# Patient Record
Sex: FEMALE | Race: WHITE | NOT HISPANIC OR LATINO
[De-identification: names, ages, dates, MRNs, and addresses within clinical notes are randomized per-mention and may not be internally consistent; named-entity substitution may affect disease eponyms.]

---

## 2017-01-20 ENCOUNTER — APPOINTMENT (OUTPATIENT)
Dept: DERMATOLOGY | Facility: CLINIC | Age: 67
End: 2017-01-20

## 2017-06-28 ENCOUNTER — OTHER (OUTPATIENT)
Age: 67
End: 2017-06-28

## 2018-01-24 ENCOUNTER — APPOINTMENT (OUTPATIENT)
Dept: DERMATOLOGY | Facility: CLINIC | Age: 68
End: 2018-01-24
Payer: MEDICARE

## 2018-01-24 PROCEDURE — 99214 OFFICE O/P EST MOD 30 MIN: CPT

## 2018-07-20 ENCOUNTER — APPOINTMENT (OUTPATIENT)
Dept: INFECTIOUS DISEASE | Facility: HOSPITAL | Age: 68
End: 2018-07-20
Payer: SELF-PAY

## 2018-07-20 DIAGNOSIS — Z71.89 OTHER SPECIFIED COUNSELING: ICD-10-CM

## 2018-07-20 PROCEDURE — 90471 IMMUNIZATION ADMIN: CPT | Mod: NC

## 2018-07-20 PROCEDURE — 90717 YELLOW FEVER VACCINE SUBQ: CPT

## 2018-07-20 PROCEDURE — 99401 PREV MED CNSL INDIV APPRX 15: CPT | Mod: 25

## 2018-07-20 RX ORDER — ATOVAQUONE AND PROGUANIL HYDROCHLORIDE 250; 100 MG/1; MG/1
250-100 TABLET, FILM COATED ORAL DAILY
Qty: 23 | Refills: 0 | Status: ACTIVE | COMMUNITY
Start: 2018-07-20 | End: 1900-01-01

## 2018-07-20 RX ORDER — AZITHROMYCIN 250 MG/1
250 TABLET, FILM COATED ORAL
Qty: 4 | Refills: 0 | Status: ACTIVE | COMMUNITY
Start: 2018-07-20 | End: 1900-01-01

## 2018-08-16 ENCOUNTER — APPOINTMENT (OUTPATIENT)
Dept: INTERNAL MEDICINE | Facility: CLINIC | Age: 68
End: 2018-08-16

## 2019-02-06 ENCOUNTER — APPOINTMENT (OUTPATIENT)
Dept: DERMATOLOGY | Facility: CLINIC | Age: 69
End: 2019-02-06
Payer: MEDICARE

## 2019-02-06 PROCEDURE — 99214 OFFICE O/P EST MOD 30 MIN: CPT

## 2019-03-19 ENCOUNTER — APPOINTMENT (OUTPATIENT)
Dept: PLASTIC SURGERY | Facility: CLINIC | Age: 69
End: 2019-03-19

## 2019-03-26 ENCOUNTER — APPOINTMENT (OUTPATIENT)
Dept: PLASTIC SURGERY | Facility: CLINIC | Age: 69
End: 2019-03-26
Payer: MEDICARE

## 2019-03-26 VITALS
OXYGEN SATURATION: 99 % | HEART RATE: 61 BPM | BODY MASS INDEX: 24.16 KG/M2 | HEIGHT: 65 IN | WEIGHT: 145 LBS | DIASTOLIC BLOOD PRESSURE: 76 MMHG | TEMPERATURE: 98.1 F | SYSTOLIC BLOOD PRESSURE: 111 MMHG

## 2019-03-26 DIAGNOSIS — S61.219A LACERATION W/OUT FOREIGN BODY OF UNSPECIFIED FINGER W/OUT DAMAGE TO NAIL, INITIAL ENCOUNTER: ICD-10-CM

## 2019-03-26 PROCEDURE — 99203 OFFICE O/P NEW LOW 30 MIN: CPT

## 2019-03-30 NOTE — ADDENDUM
[FreeTextEntry1] : I, Terrance Lucas, acted solely as a scribe for Dr. Jimmy Burns on this date 3/26/19.

## 2020-02-21 ENCOUNTER — APPOINTMENT (OUTPATIENT)
Dept: DERMATOLOGY | Facility: CLINIC | Age: 70
End: 2020-02-21
Payer: MEDICARE

## 2020-02-21 PROCEDURE — 99214 OFFICE O/P EST MOD 30 MIN: CPT

## 2020-06-19 ENCOUNTER — TRANSCRIPTION ENCOUNTER (OUTPATIENT)
Age: 70
End: 2020-06-19

## 2021-05-07 ENCOUNTER — APPOINTMENT (OUTPATIENT)
Dept: DERMATOLOGY | Facility: CLINIC | Age: 71
End: 2021-05-07
Payer: MEDICARE

## 2021-05-07 PROCEDURE — 99214 OFFICE O/P EST MOD 30 MIN: CPT

## 2021-05-25 ENCOUNTER — APPOINTMENT (OUTPATIENT)
Dept: DERMATOLOGY | Facility: CLINIC | Age: 71
End: 2021-05-25
Payer: MEDICARE

## 2021-05-25 PROCEDURE — D0098: CPT

## 2021-07-16 ENCOUNTER — APPOINTMENT (OUTPATIENT)
Dept: DERMATOLOGY | Facility: CLINIC | Age: 71
End: 2021-07-16
Payer: MEDICARE

## 2021-07-16 PROCEDURE — ZZZZZ: CPT

## 2021-07-26 ENCOUNTER — EMERGENCY (EMERGENCY)
Facility: HOSPITAL | Age: 71
LOS: 1 days | Discharge: LEFT BEFORE DISCHARGE | End: 2021-07-26
Attending: STUDENT IN AN ORGANIZED HEALTH CARE EDUCATION/TRAINING PROGRAM
Payer: MEDICARE

## 2021-07-26 VITALS
RESPIRATION RATE: 18 BRPM | DIASTOLIC BLOOD PRESSURE: 90 MMHG | SYSTOLIC BLOOD PRESSURE: 186 MMHG | OXYGEN SATURATION: 97 % | TEMPERATURE: 98 F | WEIGHT: 130.51 LBS | HEART RATE: 69 BPM | HEIGHT: 65 IN

## 2021-07-26 PROCEDURE — 99284 EMERGENCY DEPT VISIT MOD MDM: CPT | Mod: GC

## 2021-07-26 PROCEDURE — 99283 EMERGENCY DEPT VISIT LOW MDM: CPT

## 2021-07-26 PROCEDURE — 93010 ELECTROCARDIOGRAM REPORT: CPT

## 2021-07-26 PROCEDURE — 93005 ELECTROCARDIOGRAM TRACING: CPT

## 2021-07-26 NOTE — ED PROVIDER NOTE - OBJECTIVE STATEMENT
70F HLD, HTN, presents with left shoulder/neck pain waking her up 2 hours ago.  Patient also notes feeling "strange" so she took an aspirin concerned for a heart attack.   Patient states she was playing a lot of pickleball 2 days ago and was winded.  Otherwise denies pain, bleeding, SOB, chest pain, abdominal pain or fevers.  Denies other pertinent medical problems.  Denies any  substance use.

## 2021-07-26 NOTE — ED PROVIDER NOTE - CLINICAL SUMMARY MEDICAL DECISION MAKING FREE TEXT BOX
70F HLD, HTN, presents with left shoulder/neck pain waking her up 2 hours ago.  Exam/vitals normal other than some shoulder pain that is very musculoskeletal in nature.  EKG Normal

## 2021-07-26 NOTE — ED PROVIDER NOTE - ATTENDING CONTRIBUTION TO CARE
71 yo female presents for evaluation of left shoulder pain that woke her from sleep. Patient grossly well appearing. I discussed the examination with the resident, and completed the key components of the history and physical exam. I then discussed the management plan with the resident. Patient however, walked out of the department before obtaining xray, medication, and my physical evaluation.

## 2021-07-26 NOTE — ED ADULT NURSE NOTE - CHIEF COMPLAINT QUOTE
Patient states that she doesn't feel right, complaining of burning pain in back shoulders and neck radiating to arm, feeling weak in upper extremities, has HX of HTN on medication

## 2021-07-26 NOTE — ED PROVIDER NOTE - PHYSICAL EXAMINATION
General: NAD, well appearing  HEENT: Normocephalic, EOM intact  Neck: No apparent stiffness or JVD or tenderness.  Pulm: Chest wall symmetric and nontender, lungs clear to ascultation   Cardiac: Regular rate and regular rhythm  Abdomen: Nontender and nondistended  Skin: Skin is warm, dry and intact without rashes or lesions.  Neuro: No apparent motor or sensory deficits, NIH 0, full strength and sensation.  Psych: Alert, oriented, and cooperative   MSK: Patient reports mild pain in shoulder against resistance. Otherwise normal shoulder and arm, no tenderness.

## 2021-07-26 NOTE — ED PROVIDER NOTE - NS ED ROS FT
General: No fever, no massive bleeding  Head: No HA, no ongoing scalp bleed  ENT: + neck pain, no sore throat  Resp: No SOB, no hemoptysis  Cardiovascular: No CP, No LOC  GI: No abdominal pain, No blood in stool  : No dysuria, no hematuria   MSK: No back pain, + limb pain  Skin: No painful or bleeding lesions  Neuro: No paresthesias, No focal deficits

## 2021-07-26 NOTE — ED ADULT TRIAGE NOTE - CHIEF COMPLAINT QUOTE
Patient states that she doesn't feel right, complaining of burning pain in back and shoulders and neck radiating to arm, feeling weak, has HX of HTN Patient states that she doesn't feel right, complaining of burning pain in back shoulders and neck radiating to arm, feeling weak in upper extremities, has HX of HTN on medication

## 2021-12-13 ENCOUNTER — APPOINTMENT (OUTPATIENT)
Dept: ORTHOPEDIC SURGERY | Facility: CLINIC | Age: 71
End: 2021-12-13
Payer: MEDICARE

## 2021-12-13 VITALS
BODY MASS INDEX: 21.99 KG/M2 | HEIGHT: 65 IN | DIASTOLIC BLOOD PRESSURE: 90 MMHG | HEART RATE: 69 BPM | SYSTOLIC BLOOD PRESSURE: 148 MMHG | WEIGHT: 132 LBS

## 2021-12-13 DIAGNOSIS — M25.569 PAIN IN UNSPECIFIED KNEE: ICD-10-CM

## 2021-12-13 PROCEDURE — 73564 X-RAY EXAM KNEE 4 OR MORE: CPT | Mod: RT

## 2021-12-13 PROCEDURE — 73502 X-RAY EXAM HIP UNI 2-3 VIEWS: CPT | Mod: RT

## 2021-12-13 PROCEDURE — 99204 OFFICE O/P NEW MOD 45 MIN: CPT

## 2021-12-13 RX ORDER — MELOXICAM 15 MG/1
15 TABLET ORAL
Qty: 30 | Refills: 0 | Status: ACTIVE | COMMUNITY
Start: 2021-12-13 | End: 1900-01-01

## 2021-12-13 NOTE — PHYSICAL EXAM
[de-identified] : GENERAL APPEARANCE: Well nourished and hydrated, pleasant, alert, and oriented x 3. Appears their stated age. \par HEENT: Normocephalic, extraocular eye motion intact. Nasal septum midline. Oral cavity clear. External auditory canal clear. \par RESPIRATORY: Breath sounds clear and audible in all lobes. No wheezing, No accessory muscle use.\par CARDIOVASCULAR: No apparent abnormalities. No lower leg edema. No varicosities. Pedal pulses are palpable.\par NEUROLOGIC: Sensation is normal, no muscle weakness in the upper or lower extremities.\par DERMATOLOGIC: No apparent skin lesions, moist, warm, no rash.\par SPINE: Cervical spine appears normal and moves freely; thoracic spine appears normal and moves freely; lumbosacral spine appears normal and moves freely, normal, nontender.\par MUSCULOSKELETAL: Hands, wrists, and elbows are normal and move freely, shoulders are normal and move freely. \par Psychiatric: Oriented to person, place, and time, insight and judgement were intact and the affect was normal. \par Musculoskeletal: ambulates normally. Right hip exam showed positive groin pain with SLR, ROM is limited to internal/external rotation with mechanical block and pain in extremes, JAYESH negative, FADIR negative.  No significant leg length inequality noted\par 5/5 motor strength in bilateral lower extremities. Sensory: Intact in bilateral lower extremities. DTRs: Biceps, brachioradialis, triceps, patellar, ankle and plantar 2+ and symmetric bilaterally. Pulses: dorsalis pedis, posterior tibial, femoral, popliteal, and radial 2+ and symmetric bilaterally. \par Musculoskeletal:. Right knee exam shows no effusion, ROM is 0-1 35 degrees, no instability, no pain with Tayler, no joint line tenderness. \par 5/5 motor strength in bilateral lower extremities. Sensory: Intact in bilateral lower extremities. DTRs: Biceps, brachioradialis, triceps, patellar, ankle and plantar 2+ and symmetric bilaterally. Pulses: dorsalis pedis, posterior tibial, femoral, popliteal, and radial 2+ and symmetric bilaterally. \par Constitutional: Alert and in no acute distress, but well-appearing. \par  [de-identified] : AP pelvis and 2 views of the right hip obtained in the office today show no acute fracture or dislocation.  There is moderate to severe right hip osteoarthritis with joint space narrowing osteophyte formation as well as subchondral sclerosis.\par \par 4 views of the right knee obtained in the office today show no acute fracture dislocation.  There is mild medial joint space narrowing consistent with Kellgren-Amanuel grade 1 2 changes.

## 2021-12-13 NOTE — DISCUSSION/SUMMARY
[Medication Risks Reviewed] : Medication risks reviewed [Surgical risks reviewed] : Surgical risks reviewed [de-identified] : Patient is a 71-year-old female with moderate to severe right hip osteoarthritis as well as mild right knee osteoarthritis presenting today for initial evaluation.  I do think that most of the pain she is experiencing in her groin as well as her leg are coming from her hip and she is having referred pain in her knee.  She has no concerning signs on exam today of any knee pathology.  She is also not having any effusions in her knee.  I therefore recommended conservative treatment at this time.  I gave her a prescription for meloxicam 15 mg daily as needed for pain.  Have also recommended physical therapy.  I recommend an intra-articular cortisone injection to her right hip.  I will see her back after the injection for repeat examination.  If she still having pain and symptoms in her knee will consider cortisone injection at that time.  She was advised that should be a candidate for total hip in the future and we did discuss the possibility of a total hip as well as the procedure.  She would like to defer this at this time I think that is reasonable.  She will follow up in 2 to 3 months for repeat examination.

## 2021-12-28 ENCOUNTER — TRANSCRIPTION ENCOUNTER (OUTPATIENT)
Age: 71
End: 2021-12-28

## 2022-01-15 NOTE — HISTORY OF PRESENT ILLNESS
[de-identified] : 71-year-old female here today for evaluation of right hip and knee pain has been going on for many years.  Patient states over the last few months she is been having increasing pain in her groin as well as her right knee especially with activities such as navigating stairs or deep flexion.  Patient states that she is unable to rotate her knee normal in the right side like she does in the left side.  Has not tried anything for the pain.  States she is still able to play pickle ball as long as she wears a brace on her knee.  Complains of some pain behind her right kneecap.  Denies any neurovascular compromise in lower extremity.  Denies any history of trauma. Pt NPO at present

## 2022-04-27 ENCOUNTER — APPOINTMENT (OUTPATIENT)
Dept: ORTHOPEDIC SURGERY | Facility: CLINIC | Age: 72
End: 2022-04-27

## 2022-06-21 ENCOUNTER — APPOINTMENT (OUTPATIENT)
Dept: ORTHOPEDIC SURGERY | Facility: CLINIC | Age: 72
End: 2022-06-21
Payer: MEDICARE

## 2022-06-21 VITALS
DIASTOLIC BLOOD PRESSURE: 92 MMHG | SYSTOLIC BLOOD PRESSURE: 146 MMHG | BODY MASS INDEX: 21.99 KG/M2 | HEART RATE: 68 BPM | HEIGHT: 65 IN | WEIGHT: 132 LBS

## 2022-06-21 DIAGNOSIS — M25.561 PAIN IN RIGHT KNEE: ICD-10-CM

## 2022-06-21 PROCEDURE — 99213 OFFICE O/P EST LOW 20 MIN: CPT

## 2022-06-21 PROCEDURE — 73564 X-RAY EXAM KNEE 4 OR MORE: CPT | Mod: RT

## 2022-06-21 NOTE — HISTORY OF PRESENT ILLNESS
[de-identified] : Patient is a 71 year old female who presents c/o right pain.  patient states was seeing Dr. Patel in December for Right hip and knee pain stated was sent for hip injection however did not have, however did have PT with relief.  patient states 3 weeks ago was playing volleyball and twisted knee while coming down from jump.  patient states pain was diffuse with swelling.  patient was using advil and rest with ice with relief.  patient states pain currently only with twisting of knee currently.  Patient wears knee brace for relief. Patient states in past had hx of partially torn ACL and meniscal tear

## 2022-06-21 NOTE — DISCUSSION/SUMMARY
[de-identified] : ANDRIY JULIO is a 71 year old female who presents with right knee pain generalized weakness of the right leg. I believe this is most likely the result of her right hip arthritis as her right knee exam is benign and imaging shows well preserved joint spaces. A prescription for physical therapy was provided for both her hip and her knee. The patient will follow up as needed.

## 2022-06-21 NOTE — CONSULT LETTER
[Dear  ___] : Dear  [unfilled], [Consult Letter:] : I had the pleasure of evaluating your patient, [unfilled]. [Please see my note below.] : Please see my note below. [Consult Closing:] : Thank you very much for allowing me to participate in the care of this patient.  If you have any questions, please do not hesitate to contact me. [Sincerely,] : Sincerely, [FreeTextEntry2] : SARAH MATTHEWS MD\par  [FreeTextEntry3] : Octaviano Doyle MD\par Chief of Joint Replacement\par Primary [facility] Hip [facility] Knee Replacement \par [facility] [place]\par \par

## 2022-06-21 NOTE — PHYSICAL EXAM
[de-identified] : The patient appears well nourished  and in no apparent distress.  The patient is alert and oriented to person, place, and time.   Affect and mood appear normal. The head is normocephalic and atraumatic.  The eyes reveal normal sclera and extra ocular muscles are intact. The tongue is midline with no apparent lesions.  Skin shows normal turgor with no evidence of eczema or psoriasis.  No respiratory distress noted.  Sensation grossly intact.		 [de-identified] : Exam of the right hip shows hip flexion of 80 degrees, hip external rotation of 40 degrees, hip internal rotation of 0 degrees. 	 \par Exam of the right knee shows there is minimal effusion. There is no joint line tenderness.   Negative anterior drawer.  No pain with ROM.\par 5/5 motor strength bilaterally distally. Sensation intact distally.		  [de-identified] : X-ray: 4 views of the right knee demonstrate well preserved joint spaces.

## 2022-06-21 NOTE — ADDENDUM
[FreeTextEntry1] : This note was authored by Yakov Garcia working as a medical scribe for Dr. Octaviano Doyle. The note was reviewed, edited, and revised by Dr. Octaviano Doyle whom is in agreement with the exam findings, imaging findings, and treatment plan. 06/21/2022

## 2022-06-22 ENCOUNTER — TRANSCRIPTION ENCOUNTER (OUTPATIENT)
Age: 72
End: 2022-06-22

## 2022-06-22 ENCOUNTER — APPOINTMENT (OUTPATIENT)
Dept: DERMATOLOGY | Facility: CLINIC | Age: 72
End: 2022-06-22
Payer: MEDICARE

## 2022-06-22 PROCEDURE — 99214 OFFICE O/P EST MOD 30 MIN: CPT

## 2022-10-21 ENCOUNTER — APPOINTMENT (OUTPATIENT)
Dept: ORTHOPEDIC SURGERY | Facility: CLINIC | Age: 72
End: 2022-10-21

## 2022-10-21 VITALS
BODY MASS INDEX: 21.99 KG/M2 | HEART RATE: 75 BPM | DIASTOLIC BLOOD PRESSURE: 82 MMHG | WEIGHT: 132 LBS | SYSTOLIC BLOOD PRESSURE: 115 MMHG | HEIGHT: 65 IN

## 2022-10-21 PROCEDURE — 99213 OFFICE O/P EST LOW 20 MIN: CPT

## 2022-10-21 PROCEDURE — 73030 X-RAY EXAM OF SHOULDER: CPT | Mod: LT

## 2022-10-21 RX ORDER — METHYLPREDNISOLONE 4 MG/1
4 TABLET ORAL
Qty: 1 | Refills: 0 | Status: ACTIVE | COMMUNITY
Start: 2022-10-21 | End: 1900-01-01

## 2022-10-21 NOTE — HISTORY OF PRESENT ILLNESS
[de-identified] : 10/21/2022: Eva is a pleasant 71-year-old left-hand-dominant female presents to the office today complaining of left shoulder pain.  The patient is very active and plays volleyball, pickleball, and weight lifts in the gym.  Over the past 3 weeks she has noted worsening pain on the top of her left shoulder and the base of her neck and trapezial muscles.  She did have the COVID-vaccine on October 1 but she is unsure if this is related.  Her pain is primarily activity related and alleviated by rest.  It hurts to do any overhead serving in volleyball.  She has never had issues like this before.  She has had no formal treatment.  The patient denies any fevers, chills, sweats, recent illnesses, numbness, tingling, or pain elsewhere at this time.

## 2022-10-21 NOTE — CONSULT LETTER
[Dear  ___] : Dear  [unfilled], [Consult Letter:] : I had the pleasure of evaluating your patient, [unfilled]. [( Thank you for referring [unfilled] for consultation for _____ )] : Thank you for referring [unfilled] for consultation for [unfilled] [Please see my note below.] : Please see my note below. [Consult Closing:] : Thank you very much for allowing me to participate in the care of this patient.  If you have any questions, please do not hesitate to contact me. [Sincerely,] : Sincerely, [FreeTextEntry2] : SARAH MATTHEWS\par  [FreeTextEntry3] : Rayshawn Benavides DO.\par Sports Medicine \par Orthopaedic Surgery\par \par [facility] [place]\par [facility] \par [address] \[address] \[place] [address]\par \par Tel [phone]\par Fax [phone]\par \par For same day and next day orthopedic appointments contact:\par [email or web address] |[phone])\par Appointments available nights and weekends!  \par \par [facility] [place]\par Visit us at [facility]/orthopaedic\par

## 2022-10-21 NOTE — DISCUSSION/SUMMARY
[de-identified] : Assessment: 71-year-old female with left shoulder pain secondary to paraspinal/periscapular muscle spasm, possible rotator cuff tear\par \par Plan: I had a long discussion with the patient today regarding the nature of their diagnosis and treatment plan. We discussed the risks and benefits of no treatment as well as nonoperative and operative treatments.  I reviewed the patient's x-rays today with her in the office which are negative for any acute pathology.  On examination most of the patient's pain is localized to the left-sided paraspinal, trapezial, and periscapular muscles with associated spasm.  Incidentally on examination she does have some weakness to resisted forward elevation and external rotation which could indicate some degree of rotator cuff injury.  The patient is due to go to [place] for the next 2 weeks on vacation.  We will initiate a conservative treatment program including resting, icing, heating with stretching, anti-inflammatory medicines as needed, activity modifications, and home exercises.  A prescription for a Medrol Dosepak was sent to her pharmacy today.  I reviewed the risk and benefits of this medicine with her.  A referral for physical therapy was provided today to begin working exercises for the shoulder to help improve her pain and function when she returns from her vacation.  We did discuss obtaining an MRI of the shoulder at some point to rule out a possible rotator cuff injury but she would like to hold off on this at this point.  Recommend follow-up in 6 to 8 weeks for repeat evaluation.\par \par The patient verbalizes their understanding and agrees with the plan.  All questions were answered to their satisfaction.

## 2022-10-21 NOTE — PHYSICAL EXAM
[de-identified] : General:\par Awake, alert, no acute distress, Patient was cooperative and appropriate during the examination.\par \par The patient is of normal weight for height and age.\par \par Walks without an antalgic gait.\par \par Full, painless range of motion of the neck and back.\par \par Exam of the bilateral lower extremities is intact and symmetric with regards to dermatologic, vascular, and neurologic exam. Bilateral lower extremity sensation is grossly intact to light touch in the DP/SP/T/S/S nerve distributions. Intact DF/PF/EHL. BIlateral lower extremities warm and well-perfused with brisk capillary refill.\par \par \par Pulmonary:\par Regular, nonlabored breathing\par \par Abdomen:\par Soft, nontender, nondistended.\par \par Lymphatic:\par No evidence of axillary lymphadenopathy\par \par Left shoulder Exam:\par Physical exam of the shoulder demonstrates normal skin without signs of skin changes or abnormalities. No erythema, warmth, or joint effusion appreciated. \par \par Sensation intact light touch C5-T1\par Palpable radial pulse\par Radial/ulnar/median/axillary/musculocutaneous/AIN/PIN nerves grossly intact\par \par Range of motion:\par Forward Flexion: 175\par Abduction: 150\par External Rotation: 45\par Internal Rotation: L3\par \par Palpation:\par Not tender to palpation over the glenohumeral joint\par Not tender palpation over the rotator cuff insertion on the greater tuberosity\par Not tender to palpation over the AC joint\par Mildly tender to palpation of the biceps tendon/bicipital groove\par Moderately tender to palpation about the left-sided paraspinal, trapezial, and periscapular muscles\par \par Strength testing:\par Supraspinatus: 4/5\par Infraspinatus: 5/5\par Subscapularis: 5/5\par \par Special test:\par Empty can test negative positive\par Baldwin impingement test positive\par Speeds test negative\par Teton's test negative\par Lift-off test negative\par Bell-press test negative\par Cross-arm adduction test negative\par \par  [de-identified] : X-rays including 4 views of the left shoulder were obtained in the office today on 10/21/2022 and reviewed the patient.  There is no acute fracture or dislocation.  There is no significant arthritis.

## 2022-11-23 ENCOUNTER — APPOINTMENT (OUTPATIENT)
Dept: ORTHOPEDIC SURGERY | Facility: CLINIC | Age: 72
End: 2022-11-23

## 2022-11-23 VITALS
HEART RATE: 71 BPM | WEIGHT: 138 LBS | DIASTOLIC BLOOD PRESSURE: 87 MMHG | HEIGHT: 65 IN | SYSTOLIC BLOOD PRESSURE: 135 MMHG | BODY MASS INDEX: 22.99 KG/M2

## 2022-11-23 DIAGNOSIS — M25.512 PAIN IN LEFT SHOULDER: ICD-10-CM

## 2022-11-23 PROCEDURE — 99214 OFFICE O/P EST MOD 30 MIN: CPT

## 2022-11-28 ENCOUNTER — APPOINTMENT (OUTPATIENT)
Dept: ORTHOPEDIC SURGERY | Facility: CLINIC | Age: 72
End: 2022-11-28

## 2022-11-28 VITALS — HEIGHT: 65 IN | BODY MASS INDEX: 22.99 KG/M2 | WEIGHT: 138 LBS

## 2022-11-28 PROCEDURE — 99204 OFFICE O/P NEW MOD 45 MIN: CPT

## 2022-11-28 PROCEDURE — 73030 X-RAY EXAM OF SHOULDER: CPT | Mod: LT

## 2022-11-28 PROCEDURE — 73010 X-RAY EXAM OF SHOULDER BLADE: CPT | Mod: LT

## 2022-11-28 NOTE — ASSESSMENT
[FreeTextEntry1] : We discussed the underlying pathology.  Treatment options, both non-operative and operative, were reviewed.  I do think she is a candidate for surgery.  Pain relief is a goal as well as improving function and motion.  I reviewed surgical techniques pictorially in the books that I co-edited.\par \par Interscalene anesthesia, general anesthesia and postoperative pain management were discussed.  The importance of physical therapy postoperatively, the gradual recovery and the rehabilitation program with initial driving restrictions were noted.  The use of a Cryo-Cuff by [employer] and a sling for functional recovery was reviewed.  She understands there are no guarantees.  The benefits of decreased pain, increased function and restoring anatomy were outlined.  The risks were reviewed including, but not limited to, infection, failure, bleeding, stiffness, pain, clotting, fracture, re-tear, hardware failure, deformity, functional limitation, scarring, neurovascular compromise, and narcotic use issues.  Under certain circumstances we discussed, further surgery may be indicated.\par \par She understands that 100% recovery is not expected, and the desired level of function may not be achievable.  The complicated nature of her condition, including the tear pattern, was noted.  We discussed the potential for a prolonged recovery course and the potential for this to affect her activities, which could include a work regimen.  Her questions were answered.  Other opinions can be pursued, as we discussed.\par \par She does wish to proceed with surgery.  This would include a left shoulder arthroscopy, debridement, synovectomy, decompression, biceps tenodesis, large supraspinatus/infraspinatus repair, possible subscapularis repair.  Pre-op PT is prescribed.  Medical clearance is planned.  Timing was reviewed.  We will schedule this at the earliest mutual convenient time.\par \par Patient seen by Alonso Curiel M.D.\par Entered by Lena Toussaint acting as scribe.

## 2022-11-28 NOTE — IMAGING
[Left] : left shoulder [FreeTextEntry1] : The GH joint is ok. There are AC spurs. The alignment is good.  [FreeTextEntry5] : There is a type II acromion.

## 2022-11-28 NOTE — REASON FOR VISIT
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 She reports gradual pain to the left shoulder, and she continued to play pickleball. She now has more severe pain and popping to the shoulder. There is constant pain, and some night symptoms. She has pain and limitations with OH reaching. She saw Dr Benavides where she was given a MDP without relief. An MRI was ordered.  No n/t. She has been taking advil and tylenol without relief. No prior left shoulder injuries.

## 2022-11-28 NOTE — DATA REVIEWED
[MRI] : MRI [Left] : left [Shoulder] : shoulder [I independently reviewed and interpreted images and report] : I independently reviewed and interpreted images and report [I reviewed the films/CD and agree] : I reviewed the films/CD and agree [FreeTextEntry1] : LEFT SHOULDER MRI 11/11/22:\par There is a rotator cuff tear with delamination, which appears large to me.  The subscapularis has a partial tear. The biceps is perched, with fluid noted. There are slight muscle and AC changes.

## 2022-11-28 NOTE — PHYSICAL EXAM
[Moderate] : moderate [3 ___] : forward flexion 3[unfilled]/5 [5___] : external rotation 5[unfilled]/5 [Left] : left shoulder [AC Joint Arthrosis] : AC Joint Arthrosis [Type 2 acromion] : Type 2 acromion [Sitting] : sitting [] : no sensory deficits [FreeTextEntry9] : IR was not assessed.  [de-identified] : Bellypress is positive.  [FreeTextEntry7] : There are AC joint degenerative changes with spurring. [TWNoteComboBox4] : passive forward flexion 160 degrees [TWNoteComboBox6] : internal rotation L1 [de-identified] : external rotation 90 degrees

## 2022-12-05 ENCOUNTER — RX CHANGE (OUTPATIENT)
Age: 72
End: 2022-12-05

## 2022-12-05 RX ORDER — KETOROLAC TROMETHAMINE 10 MG/1
10 TABLET, FILM COATED ORAL EVERY 6 HOURS
Qty: 20 | Refills: 0 | Status: ACTIVE | COMMUNITY
Start: 2022-12-05 | End: 1900-01-01

## 2022-12-05 RX ORDER — HYDROCODONE BITARTRATE AND ACETAMINOPHEN 7.5; 325 MG/1; MG/1
7.5-325 TABLET ORAL
Qty: 42 | Refills: 0 | Status: ACTIVE | COMMUNITY
Start: 2022-12-05 | End: 1900-01-01

## 2022-12-05 RX ORDER — GABAPENTIN 300 MG/1
300 CAPSULE ORAL 3 TIMES DAILY
Qty: 15 | Refills: 0 | Status: ACTIVE | COMMUNITY
Start: 2022-12-05 | End: 1900-01-01

## 2022-12-08 ENCOUNTER — APPOINTMENT (OUTPATIENT)
Dept: ORTHOPEDIC SURGERY | Facility: CLINIC | Age: 72
End: 2022-12-08

## 2022-12-08 PROCEDURE — L3670: CPT | Mod: LT

## 2022-12-12 ENCOUNTER — LABORATORY RESULT (OUTPATIENT)
Age: 72
End: 2022-12-12

## 2022-12-15 ENCOUNTER — NON-APPOINTMENT (OUTPATIENT)
Age: 72
End: 2022-12-15

## 2022-12-15 ENCOUNTER — RESULT REVIEW (OUTPATIENT)
Age: 72
End: 2022-12-15

## 2022-12-15 ENCOUNTER — APPOINTMENT (OUTPATIENT)
Age: 72
End: 2022-12-15
Payer: MEDICARE

## 2022-12-15 ENCOUNTER — APPOINTMENT (OUTPATIENT)
Age: 72
End: 2022-12-15

## 2022-12-15 PROCEDURE — 29826 SHO ARTHRS SRG DECOMPRESSION: CPT | Mod: AS,LT

## 2022-12-15 PROCEDURE — 29826 SHO ARTHRS SRG DECOMPRESSION: CPT | Mod: LT

## 2022-12-15 PROCEDURE — 29827 SHO ARTHRS SRG RT8TR CUF RPR: CPT | Mod: LT

## 2022-12-15 PROCEDURE — 29827 SHO ARTHRS SRG RT8TR CUF RPR: CPT | Mod: AS,LT

## 2022-12-16 RX ORDER — ONDANSETRON 4 MG/1
4 TABLET, ORALLY DISINTEGRATING ORAL EVERY 4 HOURS
Qty: 30 | Refills: 0 | Status: ACTIVE | COMMUNITY
Start: 2022-12-16 | End: 1900-01-01

## 2022-12-21 RX ORDER — TRAMADOL HYDROCHLORIDE 50 MG/1
50 TABLET, COATED ORAL
Qty: 35 | Refills: 0 | Status: ACTIVE | COMMUNITY
Start: 2022-12-21 | End: 1900-01-01

## 2022-12-23 ENCOUNTER — APPOINTMENT (OUTPATIENT)
Dept: ORTHOPEDIC SURGERY | Facility: CLINIC | Age: 72
End: 2022-12-23

## 2022-12-23 VITALS — WEIGHT: 140 LBS | BODY MASS INDEX: 23.9 KG/M2 | HEIGHT: 64 IN

## 2022-12-23 PROCEDURE — 73030 X-RAY EXAM OF SHOULDER: CPT | Mod: LT

## 2022-12-23 PROCEDURE — 99024 POSTOP FOLLOW-UP VISIT: CPT

## 2023-01-02 ENCOUNTER — FORM ENCOUNTER (OUTPATIENT)
Age: 73
End: 2023-01-02

## 2023-01-13 ENCOUNTER — APPOINTMENT (OUTPATIENT)
Dept: ORTHOPEDIC SURGERY | Facility: CLINIC | Age: 73
End: 2023-01-13
Payer: MEDICARE

## 2023-01-13 VITALS — WEIGHT: 140 LBS | BODY MASS INDEX: 23.61 KG/M2 | HEIGHT: 64.5 IN

## 2023-01-13 PROCEDURE — 99024 POSTOP FOLLOW-UP VISIT: CPT

## 2023-01-13 NOTE — HISTORY OF PRESENT ILLNESS
[] : Post Surgical Visit: yes [de-identified] : pt is here today for her first post operative visit, doing well  [de-identified] : 12/15/2022 [de-identified] : left shoulder arthroscopy

## 2023-01-13 NOTE — REASON FOR VISIT
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  Her pain is minimal.  She has less pain when standing.  She is sleeping fine.

## 2023-01-13 NOTE — PHYSICAL EXAM
[Left] : left shoulder [] : no sensory deficits [FreeTextEntry9] : ROM was not assessed. [de-identified] : Strength was not assessed.

## 2023-01-13 NOTE — HISTORY OF PRESENT ILLNESS
[5] : 5 [1] : 2 [] : Post Surgical Visit: yes [de-identified] : pt is here today for a post operative visit for her left shoulder, doing well - feels more mobility to her shoulder now. pt is still in her sling today  [FreeTextEntry1] : left shoulder  [de-identified] : tylenol used as needed [de-identified] : 12/15/2022 [de-identified] : left shoulder arthroscopy

## 2023-01-13 NOTE — REASON FOR VISIT
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  She is doing OK.  She did not take the Norco.  Ketorolac was helpful.  Tramadol had also been sent, but she didn't pick this up.  She is fearful to be out of the sling.  Her  is here.

## 2023-01-13 NOTE — ASSESSMENT
[FreeTextEntry1] : Overall, she has less pain.\par Sling use discussed.\par PT prescribed, which she will do in [place].\par She arranged this thru Dr. Bolden's office.\par Questions answered.\par Caution discussed.\par \par Patient seen by Dr. Alonso Curiel.\par Progress note completed by Apolonia BAEZ.\par Patient seen by Apolonia BAEZ under the supervision of Dr. Alonso Curiel.\par Entered by Apolonia BAEZ acting as a scribe for Dr. Alonso Curiel.\par

## 2023-01-13 NOTE — ASSESSMENT
[FreeTextEntry1] : She has PT set up for [place] at Dr. Bolden's office.\par Her restrictions were outlined.\par She may d/c the sling on 1/26/23.\par Driving discussed.\par OTC medications planned.\par Questions answered.\par \par Patient seen by Dr. Alonso Curiel.\par Patient seen by Apolonia BAEZ under the supervision of Dr. Alonso Curiel.\par Entered by Apolonia BAEZ acting as a scribe for Dr. Alonso Curiel.\par Progress note completed by Apolonia BAEZ.

## 2023-01-13 NOTE — PHYSICAL EXAM
[Left] : left shoulder [] : no sensory deficits [FreeTextEntry3] : The steri strips from the lateral and posterior portals were removed. [FreeTextEntry9] : ROM was not assessed. [de-identified] : Strength was not assessed.

## 2023-05-08 ENCOUNTER — APPOINTMENT (OUTPATIENT)
Dept: ORTHOPEDIC SURGERY | Facility: CLINIC | Age: 73
End: 2023-05-08
Payer: MEDICARE

## 2023-05-08 VITALS — HEIGHT: 65 IN | BODY MASS INDEX: 23.32 KG/M2 | WEIGHT: 140 LBS

## 2023-05-08 PROCEDURE — 99214 OFFICE O/P EST MOD 30 MIN: CPT

## 2023-05-08 NOTE — REASON FOR VISIT
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 Bolden's office in [place].  She is doing well, but being cautious.  She feels 90% better.

## 2023-05-08 NOTE — HISTORY OF PRESENT ILLNESS
[3] : 3 [0] : 0 [de-identified] : pt is here today for a follow up for her left shoulder. pt states her shoulder has been improving since surgery, feels tightness sometimes  [FreeTextEntry1] : left shoulder  [de-identified] : was doing physical therapy in [place]

## 2023-05-08 NOTE — ASSESSMENT
[FreeTextEntry1] : We reviewed her course.\par She is doing well.\par Swimming and pickle ball outlined.\par PT will continue for strengthening. \par She will follow up in 6 weeks. \par Limitations reviewed. \par Cautions advised. \par Questions answered. \par \par Patient seen by Dr. Alonso Curiel.\par Patient seen by Apolonia BAEZ under the supervision of Dr. Alonso Curiel.\par Entered by Apolonia BAEZ acting as a scribe for Dr. Alonso Curiel.\par Progress note completed by Apolonia BAEZ.\par Entered by Lena Toussaint acting as scribe.

## 2023-05-08 NOTE — PHYSICAL EXAM
[Left] : left shoulder [Sitting] : sitting [Trace] : trace [4 ___] : forward flexion 4[unfilled]/5 [] : no sensory deficits [FreeTextEntry9] : IR to T12. [TWNoteComboBox4] : passive forward flexion 160 degrees [de-identified] : external rotation 60 degrees

## 2023-05-26 ENCOUNTER — APPOINTMENT (OUTPATIENT)
Dept: ORTHOPEDIC SURGERY | Facility: CLINIC | Age: 73
End: 2023-05-26
Payer: MEDICARE

## 2023-05-26 VITALS
DIASTOLIC BLOOD PRESSURE: 87 MMHG | WEIGHT: 140 LBS | SYSTOLIC BLOOD PRESSURE: 131 MMHG | BODY MASS INDEX: 23.32 KG/M2 | HEIGHT: 65 IN

## 2023-05-26 PROCEDURE — 99215 OFFICE O/P EST HI 40 MIN: CPT

## 2023-05-26 PROCEDURE — 73502 X-RAY EXAM HIP UNI 2-3 VIEWS: CPT

## 2023-05-26 NOTE — HISTORY OF PRESENT ILLNESS
[de-identified] : Ms. ANDRIY JULIO is a 72 year old female presenting for evaluation of right hip pain now worsening. Her hip pain is localized to the right groin and is worse with all weightbearing activity including walking long distance and rising from a seated position. Patient also admits to worsening stiffness which now interferes with putting on socks and shoes. She has tried PT and home exercise without improvement. Patient has tried NSAIDs without relief.

## 2023-05-26 NOTE — DISCUSSION/SUMMARY
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 I have recommended a right total hip replacement for this patient. A long discussion took place with the patient describing what a total joint replacement is and what the procedure would entail. A hip model, similar to the implants that will be used during the operation, was utilized to demonstrate the implants. Choices of implant manufactures were discussed and reviewed. The ability to secure the implant utilizing cement or cementless (press fit) fixation was discussed. Anterior and posterior exposures were discussed. For this patient an anterior approach is appropriate. The patient agrees with the plan of care as well as the use of implants.\par \par The hospitalization and post-operative care and rehabilitation were also discussed. The use of perioperative antibiotics and DVT prophylaxis were discussed. The risk, benefits and alternatives to a surgical intervention were discussed at length with the patient. The patient was also advised of risks related to the medical comorbidities and elevated body mass index (BMI). A lengthy discussion took place to review the most common complications including but not limited to: deep vein thrombosis, pulmonary embolus, heart attack, stroke, infection, wound breakdown, numbness, damage to nerves, tendon, muscles, arteries or other blood vessels, death and other possible complications from anesthesia. The patient was told that we will take steps to minimize these risks by using sterile technique, antibiotics and DVT prophylaxis when appropriate and follow the patient postoperatively in the office setting to monitor progress. The possibility of recurrent pain, no improvement in pain and actual worsening of pain were also discussed with the patient.\par \par The discharge plan of care focused on the patient going home following surgery. The patient was encouraged to make the necessary arrangements to have someone stay with them when they are discharged home. Following discharge, a home care nurse will visit the patient. The home care nurse will open your home care case and request home physical therapy services. Home physical therapy will commence following discharge provided it is appropriate and covered by the health insurance benefit plan.\par \par The benefits of surgery were discussed with the patient including the potential for improving the patient's current clinical condition through operative intervention. Alternatives to surgical intervention including continued conservative management were also discussed in detail. All questions were answered to the satisfaction of the patient. The treatment plan of care, as well as a model of a total hip equivalent to the one that will be used for their total joint replacement, was shared with the patient. The patient participated and agreed to the plan of care as well as the use of the recommended implants for their total hip replacement surgery.	 \par \par We are planning for robotic assistance for the total hip replacement. We discussed that this will require placement of iliac crest pins in the contralateral iliac crest for pelvic bone registration to allow for robotic guidance for the surgery. We will utilize robotic assistance to improve accuracy of the implants, and accurate restoration of both leg lengths and femoral offset.

## 2023-05-26 NOTE — PHYSICAL EXAM
[de-identified] : The patient appears well nourished  and in no apparent distress.  The patient is alert and oriented to person, place, and time.   Affect and mood appear normal. The head is normocephalic and atraumatic.  The eyes reveal normal sclera and extra ocular muscles are intact. The tongue is midline with no apparent lesions.  Skin shows normal turgor with no evidence of eczema or psoriasis.  No respiratory distress noted.  Sensation grossly intact.		  [de-identified] : Exam of the right hip shows hip flexion of 80 degrees with 15 degrees of obligatory external rotation, further hip external rotation of 30 degrees.\par 5/5 motor strength bilaterally distally. Sensation intact distally.		  [de-identified] : X-ray: AP view of the pelvis and 2 views of the right hip demonstrate bone on bone arthritis.

## 2023-05-26 NOTE — ADDENDUM
[FreeTextEntry1] : This note was authored by Yakov Garcia working as a medical scribe for Dr. Octaviano Doyle. The note was reviewed, edited, and revised by Dr. Octaviano Doyle whom is in agreement with the exam findings, imaging findings, and treatment plan. 05/26/2023

## 2023-06-13 ENCOUNTER — APPOINTMENT (OUTPATIENT)
Dept: CT IMAGING | Facility: CLINIC | Age: 73
End: 2023-06-13
Payer: MEDICARE

## 2023-06-13 ENCOUNTER — OUTPATIENT (OUTPATIENT)
Dept: OUTPATIENT SERVICES | Facility: HOSPITAL | Age: 73
LOS: 1 days | End: 2023-06-13
Payer: MEDICARE

## 2023-06-13 DIAGNOSIS — M16.11 UNILATERAL PRIMARY OSTEOARTHRITIS, RIGHT HIP: ICD-10-CM

## 2023-06-13 PROCEDURE — 73700 CT LOWER EXTREMITY W/O DYE: CPT

## 2023-06-13 PROCEDURE — 73700 CT LOWER EXTREMITY W/O DYE: CPT | Mod: 26,RT,MH

## 2023-06-19 ENCOUNTER — APPOINTMENT (OUTPATIENT)
Dept: ORTHOPEDIC SURGERY | Facility: CLINIC | Age: 73
End: 2023-06-19
Payer: MEDICARE

## 2023-06-19 DIAGNOSIS — M75.42 IMPINGEMENT SYNDROME OF LEFT SHOULDER: ICD-10-CM

## 2023-06-19 DIAGNOSIS — S43.003A UNSPECIFIED SUBLUXATION OF UNSPECIFIED SHOULDER JOINT, INITIAL ENCOUNTER: ICD-10-CM

## 2023-06-19 DIAGNOSIS — M75.122 COMPLETE ROTATOR CUFF TEAR OR RUPTURE OF LEFT SHOULDER, NOT SPECIFIED AS TRAUMATIC: ICD-10-CM

## 2023-06-19 PROCEDURE — 99214 OFFICE O/P EST MOD 30 MIN: CPT

## 2023-06-19 NOTE — CONSULT LETTER
[Dear  ___] : Dear  [unfilled], [Consult Letter:] : I had the pleasure of evaluating your patient, [unfilled]. [Please see my note below.] : Please see my note below. [Consult Closing:] : Thank you very much for allowing me to participate in the care of this patient.  If you have any questions, please do not hesitate to contact me. [Sincerely,] : Sincerely, [FreeTextEntry3] : Alonso Aranda M.D.\par Shoulder Surgery

## 2023-06-19 NOTE — HISTORY OF PRESENT ILLNESS
[0] : 0 [de-identified] : pt is here today for a follow up for her left shoulder. pt states she has very minimal pain to her shoulder now  [FreeTextEntry1] : left shoulder  [de-identified] : physical therapy

## 2023-06-19 NOTE — PHYSICAL EXAM
[Left] : left shoulder [Sitting] : sitting [Trace] : trace [5 ___] : forward flexion 5[unfilled]/5 [] : no sensory deficits [FreeTextEntry9] : IR to T10. [TWNoteComboBox4] : passive forward flexion 165 degrees [de-identified] : external rotation 75 degrees

## 2023-06-19 NOTE — REASON FOR VISIT
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  She had her most recent session of PT on 6/16.  She has made more gains as far as strength and motion.

## 2023-06-19 NOTE — ASSESSMENT
[FreeTextEntry1] : We reviewed her course.\par Her questions were answered.\par She will cont with her HEP.\par She is having JESIKA with Dr. Doyle in July.\par Return to sports after JESIKA should be fine for shoulder. \par Questions answered.\par \par Patient was seen by Dr. Alonso Curiel.\par Patient was seen by Apolonia BAEZ under the supervision of Dr. Alonso Curiel.\par Progress note was completed by Apolonia BAEZ.\par Entered by Lena Toussaint acting as scribe.

## 2023-07-05 ENCOUNTER — APPOINTMENT (OUTPATIENT)
Dept: DERMATOLOGY | Facility: CLINIC | Age: 73
End: 2023-07-05
Payer: MEDICARE

## 2023-07-05 ENCOUNTER — OUTPATIENT (OUTPATIENT)
Dept: OUTPATIENT SERVICES | Facility: HOSPITAL | Age: 73
LOS: 1 days | End: 2023-07-05
Payer: MEDICARE

## 2023-07-05 VITALS
TEMPERATURE: 98 F | SYSTOLIC BLOOD PRESSURE: 130 MMHG | RESPIRATION RATE: 14 BRPM | WEIGHT: 147.71 LBS | OXYGEN SATURATION: 97 % | DIASTOLIC BLOOD PRESSURE: 80 MMHG | HEIGHT: 65 IN | HEART RATE: 59 BPM

## 2023-07-05 DIAGNOSIS — I10 ESSENTIAL (PRIMARY) HYPERTENSION: ICD-10-CM

## 2023-07-05 DIAGNOSIS — M19.90 UNSPECIFIED OSTEOARTHRITIS, UNSPECIFIED SITE: ICD-10-CM

## 2023-07-05 DIAGNOSIS — Z29.9 ENCOUNTER FOR PROPHYLACTIC MEASURES, UNSPECIFIED: ICD-10-CM

## 2023-07-05 DIAGNOSIS — Z98.890 OTHER SPECIFIED POSTPROCEDURAL STATES: Chronic | ICD-10-CM

## 2023-07-05 DIAGNOSIS — Z01.818 ENCOUNTER FOR OTHER PREPROCEDURAL EXAMINATION: ICD-10-CM

## 2023-07-05 LAB
A1C WITH ESTIMATED AVERAGE GLUCOSE RESULT: 5.5 % — SIGNIFICANT CHANGE UP (ref 4–5.6)
ANION GAP SERPL CALC-SCNC: 11 MMOL/L — SIGNIFICANT CHANGE UP (ref 5–17)
APTT BLD: 28.8 SEC — SIGNIFICANT CHANGE UP (ref 27.5–35.5)
BASOPHILS # BLD AUTO: 0.02 K/UL — SIGNIFICANT CHANGE UP (ref 0–0.2)
BASOPHILS NFR BLD AUTO: 0.4 % — SIGNIFICANT CHANGE UP (ref 0–2)
BLD GP AB SCN SERPL QL: SIGNIFICANT CHANGE UP
BUN SERPL-MCNC: 16.2 MG/DL — SIGNIFICANT CHANGE UP (ref 8–20)
CALCIUM SERPL-MCNC: 9.4 MG/DL — SIGNIFICANT CHANGE UP (ref 8.4–10.5)
CHLORIDE SERPL-SCNC: 104 MMOL/L — SIGNIFICANT CHANGE UP (ref 96–108)
CO2 SERPL-SCNC: 25 MMOL/L — SIGNIFICANT CHANGE UP (ref 22–29)
CREAT SERPL-MCNC: 0.7 MG/DL — SIGNIFICANT CHANGE UP (ref 0.5–1.3)
EGFR: 92 ML/MIN/1.73M2 — SIGNIFICANT CHANGE UP
EOSINOPHIL # BLD AUTO: 0.17 K/UL — SIGNIFICANT CHANGE UP (ref 0–0.5)
EOSINOPHIL NFR BLD AUTO: 3.1 % — SIGNIFICANT CHANGE UP (ref 0–6)
ESTIMATED AVERAGE GLUCOSE: 111 MG/DL — SIGNIFICANT CHANGE UP (ref 68–114)
GLUCOSE SERPL-MCNC: 94 MG/DL — SIGNIFICANT CHANGE UP (ref 70–99)
HCT VFR BLD CALC: 39.5 % — SIGNIFICANT CHANGE UP (ref 34.5–45)
HGB BLD-MCNC: 13.1 G/DL — SIGNIFICANT CHANGE UP (ref 11.5–15.5)
IMM GRANULOCYTES NFR BLD AUTO: 0.4 % — SIGNIFICANT CHANGE UP (ref 0–0.9)
INR BLD: 0.98 RATIO — SIGNIFICANT CHANGE UP (ref 0.88–1.16)
LYMPHOCYTES # BLD AUTO: 1.28 K/UL — SIGNIFICANT CHANGE UP (ref 1–3.3)
LYMPHOCYTES # BLD AUTO: 23.4 % — SIGNIFICANT CHANGE UP (ref 13–44)
MCHC RBC-ENTMCNC: 28.4 PG — SIGNIFICANT CHANGE UP (ref 27–34)
MCHC RBC-ENTMCNC: 33.2 GM/DL — SIGNIFICANT CHANGE UP (ref 32–36)
MCV RBC AUTO: 85.7 FL — SIGNIFICANT CHANGE UP (ref 80–100)
MONOCYTES # BLD AUTO: 0.45 K/UL — SIGNIFICANT CHANGE UP (ref 0–0.9)
MONOCYTES NFR BLD AUTO: 8.2 % — SIGNIFICANT CHANGE UP (ref 2–14)
MRSA PCR RESULT.: SIGNIFICANT CHANGE UP
NEUTROPHILS # BLD AUTO: 3.53 K/UL — SIGNIFICANT CHANGE UP (ref 1.8–7.4)
NEUTROPHILS NFR BLD AUTO: 64.5 % — SIGNIFICANT CHANGE UP (ref 43–77)
PLATELET # BLD AUTO: 169 K/UL — SIGNIFICANT CHANGE UP (ref 150–400)
POTASSIUM SERPL-MCNC: 4.3 MMOL/L — SIGNIFICANT CHANGE UP (ref 3.5–5.3)
POTASSIUM SERPL-SCNC: 4.3 MMOL/L — SIGNIFICANT CHANGE UP (ref 3.5–5.3)
PROTHROM AB SERPL-ACNC: 11.4 SEC — SIGNIFICANT CHANGE UP (ref 10.5–13.4)
RBC # BLD: 4.61 M/UL — SIGNIFICANT CHANGE UP (ref 3.8–5.2)
RBC # FLD: 13.3 % — SIGNIFICANT CHANGE UP (ref 10.3–14.5)
S AUREUS DNA NOSE QL NAA+PROBE: DETECTED
SODIUM SERPL-SCNC: 140 MMOL/L — SIGNIFICANT CHANGE UP (ref 135–145)
WBC # BLD: 5.47 K/UL — SIGNIFICANT CHANGE UP (ref 3.8–10.5)
WBC # FLD AUTO: 5.47 K/UL — SIGNIFICANT CHANGE UP (ref 3.8–10.5)

## 2023-07-05 PROCEDURE — G0463: CPT

## 2023-07-05 PROCEDURE — 93010 ELECTROCARDIOGRAM REPORT: CPT

## 2023-07-05 PROCEDURE — 93005 ELECTROCARDIOGRAM TRACING: CPT

## 2023-07-05 PROCEDURE — 99214 OFFICE O/P EST MOD 30 MIN: CPT

## 2023-07-05 RX ORDER — MUPIROCIN 20 MG/G
1 OINTMENT TOPICAL
Qty: 1 | Refills: 0
Start: 2023-07-05 | End: 2023-07-09

## 2023-07-05 NOTE — H&P PST ADULT - NSICDXPASTMEDICALHX_GEN_ALL_CORE_FT
PAST MEDICAL HISTORY:  Hypertension      PAST MEDICAL HISTORY:  Hyperlipidemia with low HDL     Hypertension      PAST MEDICAL HISTORY:  Hyperlipidemia with low HDL     Hypertension     OAB (overactive bladder)

## 2023-07-05 NOTE — H&P PST ADULT - MUSCULOSKELETAL
ROM intact/normal gait/strength 5/5 bilateral upper extremities/strength 5/5 bilateral lower extremities details… ROM intact/decreased ROM due to pain/normal gait/strength 5/5 bilateral upper extremities/strength 5/5 bilateral lower extremities/back exam/extremities exam

## 2023-07-05 NOTE — H&P PST ADULT - HISTORY OF PRESENT ILLNESS
HIP OA - Pt report jleft hip pain started about left hip pain now progressively worsening. Pt states pain localized to the left groin and it is worsen with weight bearing including walking long distance and rising from a seated position.  Pt has worsening stiffness which now interfers with ADLs such as put on socks and shoes.  Pain relieve with rest and pain medication HIP OA - Pt report right hip pain started about left hip pain now progressively worsening. Pt states pain localized to the left groin and it is worsen with weight bearing including walking long distance and rising from a seated position.  Pt has worsening stiffness which now interferes with ADLs such as put on socks and shoes.  Pain relieve with rest doest not take pain medication This is a 72 year old female presenting to PST today. The patient has a PMH of HTN, HDL and OAB. Pt reports right hip pain now progressively worsening 5/10 in severity describes the pain a throbbing. Pt states pain localized to the right groin and it is worsen with weight bearing including walking long distance and rising from a seated position.  Pt has worsening stiffness and painful to bend over. Pain relieve with rest doest not take pain medication.  Denies fever/chills, N/V/D, change in bowel/bladder habits or any other related symptoms. She is scheduled for a Right ([place]) Total Hip Replacement on 7/24/23 with Dr. Payne.

## 2023-07-05 NOTE — H&P PST ADULT - NSANTHOSAYNRD_GEN_A_CORE
No. OJDY screening performed.  STOP BANG Legend: 0-2 = LOW Risk; 3-4 = INTERMEDIATE Risk; 5-8 = HIGH Risk

## 2023-07-05 NOTE — H&P PST ADULT - ASSESSMENT
CAPRINI SCORE    AGE RELATED RISK FACTORS                                                             [ ] Age 41-60 years                                            (1 Point)  [ ] Age: 61-74 years                                           (2 Points)                 [ ] Age= 75 years                                                (3 Points)             DISEASE RELATED RISK FACTORS                                                       [ ] Edema in the lower extremities                 (1 Point)                     [ ] Varicose veins                                               (1 Point)                                 [ ] BMI > 25 Kg/m2                                            (1 Point)                                  [ ] Serious infection (ie PNA)                            (1 Point)                     [ ] Lung disease ( COPD, Emphysema)            (1 Point)                                                                          [ ] Acute myocardial infarction                         (1 Point)                  [ ] Congestive heart failure (in the previous month)  (1 Point)         [ ] Inflammatory bowel disease                            (1 Point)                  [ ] Central venous access, PICC or Port               (2 points)       (within the last month)                                                                [ ] Stroke (in the previous month)                        (5 Points)    [ ] Previous or present malignancy                       (2 points)                                                                                                                                                         HEMATOLOGY RELATED FACTORS                                                         [ ] Prior episodes of VTE                                     (3 Points)                     [ ] Positive family history for VTE                      (3 Points)                  [ ] Prothrombin 17401 A                                     (3 Points)                     [ ] Factor V Leiden                                                (3 Points)                        [ ] Lupus anticoagulants                                      (3 Points)                                                           [ ] Anticardiolipin antibodies                              (3 Points)                                                       [ ] High homocysteine in the blood                   (3 Points)                                             [ ] Other congenital or acquired thrombophilia      (3 Points)                                                [ ] Heparin induced thrombocytopenia                  (3 Points)                                        MOBILITY RELATED FACTORS  [ ] Bed rest                                                         (1 Point)  [ ] Plaster cast                                                    (2 points)  [ ] Bed bound for more than 72 hours           (2 Points)    GENDER SPECIFIC FACTORS  [ ] Pregnancy or had a baby within the last month   (1 Point)  [ ] Post-partum < 6 weeks                                   (1 Point)  [ ] Hormonal therapy  or oral contraception   (1 Point)  [ ] History of pregnancy complications              (1 point)  [ ] Unexplained or recurrent              (1 Point)    OTHER RISK FACTORS                                           (1 Point)  [ ] BMI >40, smoking, diabetes requiring insulin, chemotherapy  blood transfusions and length of surgery over 2 hours    SURGERY RELATED RISK FACTORS  [ ]  Section within the last month     (1 Point)  [ ] Minor surgery                                                  (1 Point)  [ ] Arthroscopic surgery                                       (2 Points)  [ ] Planned major surgery lasting more            (2 Points)      than 45 minutes     [ ] Elective hip or knee joint replacement       (5 points)       surgery                                                TRAUMA RELATED RISK FACTORS  [ ] Fracture of the hip, pelvis, or leg                       (5 Points)  [ ] Spinal cord injury resulting in paralysis             (5 points)       (in the previous month)    [ ] Paralysis  (less than 1 month)                             (5 Points)  [ ] Multiple Trauma within 1 month                        (5 Points)    Total Score [        ]    Caprini Score 0-2: Low Risk, NO VTE prophylaxis required for most patients, encourage ambulation  Caprini Score 3-6: Moderate Risk , pharmacologic VTE prophylaxis is indicated for most patients (in the absence of contraindications)  Caprini Score Greater than or =7: High risk, pharmocologic VTE prophylaxis indicated for most patients (in the absence of contraindications)                              OPIOID RISK TOOL    RICHIE EACH BOX THAT APPLIES AND ADD TOTALS AT THE END    FAMILY HISTORY OF SUBSTANCE ABUSE                 FEMALE         MALE                                                Alcohol                             [  ]1 pt          [  ]3pts                                               Illegal Durgs                     [  ]2 pts        [  ]3pts                                               Rx Drugs                           [  ]4 pts        [  ]4 pts    PERSONAL HISTORY OF SUBSTANCE ABUSE                                                                                          Alcohol                             [  ]3 pts       [  ]3 pts                                               Illegal Drugs                     [  ]4 pts        [  ]4 pts                                               Rx Drugs                           [  ]5 pts        [  ]5 pts    AGE BETWEEN 16-45 YEARS                                      [  ]1 pt         [  ]1 pt    HISTORY OF PREADOLESCENT   SEXUAL ABUSE                                                             [  ]3 pts        [  ]0pts    PSYCHOLOGICAL DISEASE                     ADD, OCD, Bipolar, Schizophrenia        [  ]2 pts         [  ]2 pts                      Depression                                               [  ]1 pt           [  ]1 pt           SCORING TOTAL   (add numbers and type here)              (*0**)                                     A score of 3 or lower indicated LOW risk for future opioid abuse  A score of 4 to 7 indicated moderate risk for future opioid abuse  A score of 8 or higher indicates a high risk for opioid abuse     This is a 72 year old female presenting to PST today. The patient has a PMH of HTN, HDL and OAB. Pt reports right hip pain now progressively worsening 5/10 in severity describes the pain a throbbing. Pt states pain localized to the right groin and it is worsen with weight bearing including walking long distance and rising from a seated position.  Pt has worsening stiffness and painful to bend over. Pain relieve with rest doest not take pain medication.  Denies fever/chills, N/V/D, change in bowel/bladder habits or any other related symptoms. She is scheduled for a Right ([place]) Total Hip Replacement on 23 with Dr. Payne.     CAPRINI SCORE    AGE RELATED RISK FACTORS                                                             [ ] Age 41-60 years                                            (1 Point)  [x ] Age: 61-74 years                                           (2 Points)                 [ ] Age= 75 years                                                (3 Points)             DISEASE RELATED RISK FACTORS                                                       [ ] Edema in the lower extremities                 (1 Point)                     [ ] Varicose veins                                               (1 Point)                                 [ ] BMI > 25 Kg/m2                                            (1 Point)                                  [ ] Serious infection (ie PNA)                            (1 Point)                     [ ] Lung disease ( COPD, Emphysema)            (1 Point)                                                                          [ ] Acute myocardial infarction                         (1 Point)                  [ ] Congestive heart failure (in the previous month)  (1 Point)         [ ] Inflammatory bowel disease                            (1 Point)                  [ ] Central venous access, PICC or Port               (2 points)       (within the last month)                                                                [ ] Stroke (in the previous month)                        (5 Points)    [ ] Previous or present malignancy                       (2 points)                                                                                                                                                         HEMATOLOGY RELATED FACTORS                                                         [ ] Prior episodes of VTE                                     (3 Points)                     [ ] Positive family history for VTE                      (3 Points)                  [ ] Prothrombin 09790 A                                     (3 Points)                     [ ] Factor V Leiden                                                (3 Points)                        [ ] Lupus anticoagulants                                      (3 Points)                                                           [ ] Anticardiolipin antibodies                              (3 Points)                                                       [ ] High homocysteine in the blood                   (3 Points)                                             [ ] Other congenital or acquired thrombophilia      (3 Points)                                                [ ] Heparin induced thrombocytopenia                  (3 Points)                                        MOBILITY RELATED FACTORS  [ ] Bed rest                                                         (1 Point)  [ ] Plaster cast                                                    (2 points)  [ ] Bed bound for more than 72 hours           (2 Points)    GENDER SPECIFIC FACTORS  [ ] Pregnancy or had a baby within the last month   (1 Point)  [ ] Post-partum < 6 weeks                                   (1 Point)  [ ] Hormonal therapy  or oral contraception   (1 Point)  [ ] History of pregnancy complications              (1 point)  [ ] Unexplained or recurrent              (1 Point)    OTHER RISK FACTORS                                           (1 Point)  [ ] BMI >40, smoking, diabetes requiring insulin, chemotherapy  blood transfusions and length of surgery over 2 hours    SURGERY RELATED RISK FACTORS  [ ]  Section within the last month     (1 Point)  [ ] Minor surgery                                                  (1 Point)  [ ] Arthroscopic surgery                                       (2 Points)  [ ] Planned major surgery lasting more            (2 Points)      than 45 minutes     [x ] Elective hip or knee joint replacement       (5 points)       surgery                                                TRAUMA RELATED RISK FACTORS  [ ] Fracture of the hip, pelvis, or leg                       (5 Points)  [ ] Spinal cord injury resulting in paralysis             (5 points)       (in the previous month)    [ ] Paralysis  (less than 1 month)                             (5 Points)  [ ] Multiple Trauma within 1 month                        (5 Points)    Total Score [    7    ]    Caprini Score 0-2: Low Risk, NO VTE prophylaxis required for most patients, encourage ambulation  Caprini Score 3-6: Moderate Risk , pharmacologic VTE prophylaxis is indicated for most patients (in the absence of contraindications)  Caprini Score Greater than or =7: High risk, pharmocologic VTE prophylaxis indicated for most patients (in the absence of contraindications)    OPIOID RISK TOOL    RICHIE EACH BOX THAT APPLIES AND ADD TOTALS AT THE END    FAMILY HISTORY OF SUBSTANCE ABUSE                 FEMALE         MALE                                                Alcohol                             [  ]1 pt          [  ]3pts                                               Illegal Durgs                     [  ]2 pts        [  ]3pts                                               Rx Drugs                           [  ]4 pts        [  ]4 pts    PERSONAL HISTORY OF SUBSTANCE ABUSE                                                                                          Alcohol                             [  ]3 pts       [  ]3 pts                                               Illegal Drugs                     [  ]4 pts        [  ]4 pts                                               Rx Drugs                           [  ]5 pts        [  ]5 pts    AGE BETWEEN 16-45 YEARS                                      [  ]1 pt         [  ]1 pt    HISTORY OF PREADOLESCENT   SEXUAL ABUSE                                                             [  ]3 pts        [  ]0pts    PSYCHOLOGICAL DISEASE                     ADD, OCD, Bipolar, Schizophrenia        [  ]2 pts         [  ]2 pts                      Depression                                               [  ]1 pt           [  ]1 pt           SCORING TOTAL   (add numbers and type here)              (*0**)                                     A score of 3 or lower indicated LOW risk for future opioid abuse  A score of 4 to 7 indicated moderate risk for future opioid abuse  A score of 8 or higher indicates a high risk for opioid abuse

## 2023-07-05 NOTE — H&P PST ADULT - TEMPERATURE IN CELSIUS (DEGREES C)
Additional Notes: Patient consent was obtained to proceed with the visit and recommended plan of care after discussion of all risks and benefits, including the risks of COVID-19 exposure. Detail Level: Simple Additional Notes: Re-evaluate in august. Advised to apply aquaphor into area Render Risk Assessment In Note?: yes 36.7

## 2023-07-06 LAB — ALLERGY+IMMUNOLOGY DIAG STUDY NOTE: SIGNIFICANT CHANGE UP

## 2023-07-14 ENCOUNTER — APPOINTMENT (OUTPATIENT)
Dept: ORTHOPEDIC SURGERY | Facility: CLINIC | Age: 73
End: 2023-07-14
Payer: MEDICARE

## 2023-07-14 VITALS — HEIGHT: 65 IN | WEIGHT: 140 LBS | BODY MASS INDEX: 23.32 KG/M2

## 2023-07-14 DIAGNOSIS — M16.11 UNILATERAL PRIMARY OSTEOARTHRITIS, RIGHT HIP: ICD-10-CM

## 2023-07-14 PROCEDURE — 99213 OFFICE O/P EST LOW 20 MIN: CPT

## 2023-07-14 RX ORDER — OXYCODONE 5 MG/1
5 TABLET ORAL
Qty: 42 | Refills: 0 | Status: ACTIVE | COMMUNITY
Start: 2023-07-14 | End: 1900-01-01

## 2023-07-14 RX ORDER — ASPIRIN 81 MG
81 TABLET, DELAYED RELEASE (ENTERIC COATED) ORAL
Qty: 84 | Refills: 0 | Status: ACTIVE | COMMUNITY
Start: 2023-07-14 | End: 1900-01-01

## 2023-07-14 RX ORDER — ACETAMINOPHEN 500 MG/1
500 TABLET, COATED ORAL
Qty: 90 | Refills: 0 | Status: ACTIVE | COMMUNITY
Start: 2023-07-14 | End: 1900-01-01

## 2023-07-14 RX ORDER — CEPHALEXIN 500 MG/1
500 TABLET ORAL 4 TIMES DAILY
Qty: 12 | Refills: 0 | Status: ACTIVE | COMMUNITY
Start: 2023-07-14 | End: 1900-01-01

## 2023-07-14 NOTE — PHYSICAL EXAM
[de-identified] : The patient appears well nourished  and in no apparent distress.  The patient is alert and oriented to person, place, and time.   Affect and mood appear normal. The head is normocephalic and atraumatic.  The eyes reveal normal sclera and extra ocular muscles are intact. The tongue is midline with no apparent lesions.  Skin shows normal turgor with no evidence of eczema or psoriasis.  No respiratory distress noted.  Sensation grossly intact.		  [de-identified] : Exam of the right hip shows hip flexion of 80 degrees with 15 degrees of obligatory external rotation, further hip external rotation of 30 degrees.\par 5/5 motor strength bilaterally distally. Sensation intact distally.		  [de-identified] : prior X-ray: AP view of the pelvis and 2 views of the right hip demonstrate bone on bone arthritis.

## 2023-07-14 NOTE — DISCUSSION/SUMMARY
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 I have recommended a right total hip replacement for this patient. A long discussion took place with the patient describing what a total joint replacement is and what the procedure would entail. A hip model, similar to the implants that will be used during the operation, was utilized to demonstrate the implants. Choices of implant manufactures were discussed and reviewed. The ability to secure the implant utilizing cement or cementless (press fit) fixation was discussed. Anterior and posterior exposures were discussed. For this patient an anterior approach is appropriate. The patient agrees with the plan of care as well as the use of implants.\par \par The hospitalization and post-operative care and rehabilitation were also discussed. The use of perioperative antibiotics and DVT prophylaxis were discussed. The risk, benefits and alternatives to a surgical intervention were discussed at length with the patient. The patient was also advised of risks related to the medical comorbidities and elevated body mass index (BMI). A lengthy discussion took place to review the most common complications including but not limited to: deep vein thrombosis, pulmonary embolus, heart attack, stroke, infection, wound breakdown, numbness, damage to nerves, tendon, muscles, arteries or other blood vessels, death and other possible complications from anesthesia. The patient was told that we will take steps to minimize these risks by using sterile technique, antibiotics and DVT prophylaxis when appropriate and follow the patient postoperatively in the office setting to monitor progress. The possibility of recurrent pain, no improvement in pain and actual worsening of pain were also discussed with the patient.\par \par The discharge plan of care focused on the patient going home following surgery. The patient was encouraged to make the necessary arrangements to have someone stay with them when they are discharged home. Following discharge, a home care nurse will visit the patient. The home care nurse will open your home care case and request home physical therapy services. Home physical therapy will commence following discharge provided it is appropriate and covered by the health insurance benefit plan.\par \par The benefits of surgery were discussed with the patient including the potential for improving the patient's current clinical condition through operative intervention. Alternatives to surgical intervention including continued conservative management were also discussed in detail. All questions were answered to the satisfaction of the patient. The treatment plan of care, as well as a model of a total hip equivalent to the one that will be used for their total joint replacement, was shared with the patient. The patient participated and agreed to the plan of care as well as the use of the recommended implants for their total hip replacement surgery.	 \par \par We are planning for robotic assistance for the total hip replacement. We discussed that this will require placement of iliac crest pins in the contralateral iliac crest for pelvic bone registration to allow for robotic guidance for the surgery. We will utilize robotic assistance to improve accuracy of the implants, and accurate restoration of both leg lengths and femoral offset.		 \par \par Prescriptions for Ecotrin, Pantoprazole, Celebrex, Tylenol, Oxycodone, and Keflex sent to pharmacy. All questions answered.

## 2023-07-14 NOTE — HISTORY OF PRESENT ILLNESS
[de-identified] : Ms. ANDRIY JULIO is a 72 year old female presenting for evaluation of right hip pain now worsening. Her hip pain is localized to the right groin and is worse with all weightbearing activity including walking long distance and rising from a seated position. Patient also admits to worsening stiffness which now interferes with putting on socks and shoes. She has tried PT and home exercise without improvement. Patient has tried NSAIDs without relief. She is on schedule for right THR on 7/24/23.

## 2023-07-19 ENCOUNTER — APPOINTMENT (OUTPATIENT)
Dept: PHYSICAL MEDICINE AND REHAB | Facility: CLINIC | Age: 73
End: 2023-07-19
Payer: MEDICARE

## 2023-07-19 VITALS
SYSTOLIC BLOOD PRESSURE: 135 MMHG | DIASTOLIC BLOOD PRESSURE: 86 MMHG | HEIGHT: 65 IN | HEART RATE: 71 BPM | WEIGHT: 146 LBS | BODY MASS INDEX: 24.32 KG/M2

## 2023-07-19 DIAGNOSIS — M25.551 PAIN IN RIGHT HIP: ICD-10-CM

## 2023-07-19 DIAGNOSIS — M16.11 UNILATERAL PRIMARY OSTEOARTHRITIS, RIGHT HIP: ICD-10-CM

## 2023-07-19 PROCEDURE — 99204 OFFICE O/P NEW MOD 45 MIN: CPT

## 2023-07-19 RX ORDER — ONDANSETRON 4 MG/1
4 TABLET ORAL
Qty: 14 | Refills: 0 | Status: ACTIVE | COMMUNITY
Start: 2023-07-19 | End: 1900-01-01

## 2023-07-19 NOTE — HISTORY OF PRESENT ILLNESS
[FreeTextEntry1] : Ms. ANDRIY JULIO is a 72 year old female with a PMHx of HTN who presents with low back pain. \par \par Location:R lateral hip/low back\par Onset:Chronic, gradually worsening with time\par Provocation/Palliative:Worse with walking and rising from seated position. \par Quality:Sharp\par Radiation:Occasionally down to R knee\par Severity:Mild to moderate\par Timing:Not improving with time\par \par Denies any associated numbness. Denies any associated leg weakness. Denies any loss of bowel/bladder control or any groin numbness.\par Previous medications trialed:Tries not to take medication\par Previous procedures relevant to complaint:Pending R THR next week\par Conservative therapy tried?:Has tried HEP for 6+ weeks\par

## 2023-07-19 NOTE — DATA REVIEWED
[FreeTextEntry1] : X-ray Pelvis 5/26/23 reviewed by me: significant joint space narrowing seen at R hip

## 2023-07-19 NOTE — PHYSICAL EXAM
[FreeTextEntry1] : PE:\par Constitutional: In NAD, calm and cooperative\par MSK (Back/R hip)\par 	Inspection: no gross swelling identified\par 	Palpation: Tenderness of the right lower lumbar paraspinals\par 	ROM: Pain at end lumbar extension, no pain with flexion\par 	Strength: 5/5 strength in bilateral lower extremities\par 	Reflexes: 2+ Patella reflex bilaterally, 2+ Achilles reflex bilaterally, negative clonus bilaterally\par 	Sensation: Intact to light touch in bilateral lower extremities\par Special tests:\par SLR:negative bilaterally\par JAYESH:positive on right, negative on left\par FADIR: positive on right, negative on left\par Facet loading: positive on right, negative on left

## 2023-07-19 NOTE — ASSESSMENT
[FreeTextEntry1] : Ms. ANDRIY JULIO is a 72 year old female who presents with R lower back/hip pain, likely a combination of 2 separate pain generators, one being  her R hip OA and the other being lumbar spondylosis. Denies any red flag signs. Will recommend:\par - X-ray Pelvis reviewed\par - Will obtain MRI L Spine to evaluate for spondylosis given persistent pain\par - Patient will let me know how her R JESIKA goes next week and see if her back pain improves after surgery\par - Briefly discussed R/B/A of lumbar injections if pain persists for which patient would like to hold off for now. \par \par RTC after MRI. Patient in agreement with plan. Patient aware of red flag signs including any changes to their bowel/bladder control, groin numbness or new weakness. Patient knows to seek immediate attention by calling 911 or going to nearest ER if these symptoms appear.

## 2023-07-23 ENCOUNTER — TRANSCRIPTION ENCOUNTER (OUTPATIENT)
Age: 73
End: 2023-07-23

## 2023-07-24 ENCOUNTER — TRANSCRIPTION ENCOUNTER (OUTPATIENT)
Age: 73
End: 2023-07-24

## 2023-07-24 ENCOUNTER — RESULT REVIEW (OUTPATIENT)
Age: 73
End: 2023-07-24

## 2023-07-24 ENCOUNTER — APPOINTMENT (OUTPATIENT)
Dept: ORTHOPEDIC SURGERY | Facility: HOSPITAL | Age: 73
End: 2023-07-24

## 2023-07-24 ENCOUNTER — OUTPATIENT (OUTPATIENT)
Dept: INPATIENT UNIT | Facility: HOSPITAL | Age: 73
LOS: 1 days | End: 2023-07-24
Payer: MEDICARE

## 2023-07-24 VITALS
RESPIRATION RATE: 16 BRPM | HEART RATE: 68 BPM | OXYGEN SATURATION: 98 % | DIASTOLIC BLOOD PRESSURE: 88 MMHG | SYSTOLIC BLOOD PRESSURE: 144 MMHG

## 2023-07-24 VITALS
HEART RATE: 66 BPM | TEMPERATURE: 98 F | HEIGHT: 65 IN | DIASTOLIC BLOOD PRESSURE: 93 MMHG | OXYGEN SATURATION: 99 % | RESPIRATION RATE: 15 BRPM | WEIGHT: 145.06 LBS | SYSTOLIC BLOOD PRESSURE: 151 MMHG

## 2023-07-24 DIAGNOSIS — Z98.890 OTHER SPECIFIED POSTPROCEDURAL STATES: Chronic | ICD-10-CM

## 2023-07-24 DIAGNOSIS — M16.11 UNILATERAL PRIMARY OSTEOARTHRITIS, RIGHT HIP: ICD-10-CM

## 2023-07-24 LAB — ABO RH CONFIRMATION: SIGNIFICANT CHANGE UP

## 2023-07-24 PROCEDURE — 73502 X-RAY EXAM HIP UNI 2-3 VIEWS: CPT | Mod: 26,RT

## 2023-07-24 PROCEDURE — 36415 COLL VENOUS BLD VENIPUNCTURE: CPT

## 2023-07-24 PROCEDURE — 27130 TOTAL HIP ARTHROPLASTY: CPT | Mod: RT

## 2023-07-24 PROCEDURE — 0055T BONE SRGRY CMPTR CT/MRI IMAG: CPT | Mod: RT

## 2023-07-24 PROCEDURE — C1889: CPT

## 2023-07-24 PROCEDURE — C1776: CPT

## 2023-07-24 PROCEDURE — S2900: CPT

## 2023-07-24 PROCEDURE — 73502 X-RAY EXAM HIP UNI 2-3 VIEWS: CPT

## 2023-07-24 PROCEDURE — C1713: CPT

## 2023-07-24 PROCEDURE — 27130 TOTAL HIP ARTHROPLASTY: CPT | Mod: AS,RT

## 2023-07-24 DEVICE — BONE WAX 2.5GM: Type: IMPLANTABLE DEVICE | Site: RIGHT | Status: FUNCTIONAL

## 2023-07-24 DEVICE — SCREW HEX LO PROF 6.5X25MM: Type: IMPLANTABLE DEVICE | Site: RIGHT | Status: FUNCTIONAL

## 2023-07-24 DEVICE — FEM HD CERAMIC ART BIOLOX DELTA: Type: IMPLANTABLE DEVICE | Site: RIGHT | Status: FUNCTIONAL

## 2023-07-24 DEVICE — STEM FEM ACTIS TAPR STD COLLAR SZ 6: Type: IMPLANTABLE DEVICE | Site: RIGHT | Status: FUNCTIONAL

## 2023-07-24 DEVICE — LINER ACET TRIDENT X3 0 DEG 36MM D: Type: IMPLANTABLE DEVICE | Site: RIGHT | Status: FUNCTIONAL

## 2023-07-24 DEVICE — SCREW HEX LO PROF 6.5X40MM: Type: IMPLANTABLE DEVICE | Site: RIGHT | Status: FUNCTIONAL

## 2023-07-24 DEVICE — MAKO BONE PIN 4MM X 170MM: Type: IMPLANTABLE DEVICE | Site: RIGHT | Status: FUNCTIONAL

## 2023-07-24 DEVICE — MAKO KNEE TIBIAL CHECKPOINT: Type: IMPLANTABLE DEVICE | Site: RIGHT | Status: FUNCTIONAL

## 2023-07-24 DEVICE — SHELL ACET TRIDENT II D 48MM: Type: IMPLANTABLE DEVICE | Site: RIGHT | Status: FUNCTIONAL

## 2023-07-24 RX ORDER — TRANEXAMIC ACID 100 MG/ML
1000 INJECTION, SOLUTION INTRAVENOUS ONCE
Refills: 0 | Status: DISCONTINUED | OUTPATIENT
Start: 2023-07-24 | End: 2023-07-24

## 2023-07-24 RX ORDER — ACETAMINOPHEN 500 MG
975 TABLET ORAL ONCE
Refills: 0 | Status: COMPLETED | OUTPATIENT
Start: 2023-07-24 | End: 2023-07-24

## 2023-07-24 RX ORDER — ONDANSETRON 8 MG/1
4 TABLET, FILM COATED ORAL EVERY 6 HOURS
Refills: 0 | Status: DISCONTINUED | OUTPATIENT
Start: 2023-07-24 | End: 2023-08-07

## 2023-07-24 RX ORDER — CEFAZOLIN SODIUM 1 G
2000 VIAL (EA) INJECTION
Refills: 0 | Status: DISCONTINUED | OUTPATIENT
Start: 2023-07-24 | End: 2023-08-07

## 2023-07-24 RX ORDER — ACETAMINOPHEN 500 MG
1000 TABLET ORAL ONCE
Refills: 0 | Status: DISCONTINUED | OUTPATIENT
Start: 2023-07-24 | End: 2023-08-07

## 2023-07-24 RX ORDER — CELECOXIB 200 MG/1
200 CAPSULE ORAL EVERY 12 HOURS
Refills: 0 | Status: DISCONTINUED | OUTPATIENT
Start: 2023-07-26 | End: 2023-08-07

## 2023-07-24 RX ORDER — OXYCODONE HYDROCHLORIDE 5 MG/1
1 TABLET ORAL
Qty: 0 | Refills: 0 | DISCHARGE
Start: 2023-07-24

## 2023-07-24 RX ORDER — ASPIRIN/CALCIUM CARB/MAGNESIUM 324 MG
81 TABLET ORAL
Refills: 0 | Status: DISCONTINUED | OUTPATIENT
Start: 2023-07-24 | End: 2023-08-07

## 2023-07-24 RX ORDER — SENNA PLUS 8.6 MG/1
2 TABLET ORAL
Qty: 0 | Refills: 0 | DISCHARGE
Start: 2023-07-24

## 2023-07-24 RX ORDER — SENNA PLUS 8.6 MG/1
2 TABLET ORAL AT BEDTIME
Refills: 0 | Status: DISCONTINUED | OUTPATIENT
Start: 2023-07-24 | End: 2023-08-07

## 2023-07-24 RX ORDER — CELECOXIB 200 MG/1
400 CAPSULE ORAL ONCE
Refills: 0 | Status: COMPLETED | OUTPATIENT
Start: 2023-07-24 | End: 2023-07-24

## 2023-07-24 RX ORDER — CEFAZOLIN SODIUM 1 G
2000 VIAL (EA) INJECTION ONCE
Refills: 0 | Status: DISCONTINUED | OUTPATIENT
Start: 2023-07-24 | End: 2023-07-24

## 2023-07-24 RX ORDER — CEPHALEXIN 500 MG
500 CAPSULE ORAL
Refills: 0 | Status: DISCONTINUED | OUTPATIENT
Start: 2023-07-24 | End: 2023-08-07

## 2023-07-24 RX ORDER — SODIUM CHLORIDE 9 MG/ML
3 INJECTION INTRAMUSCULAR; INTRAVENOUS; SUBCUTANEOUS ONCE
Refills: 0 | Status: DISCONTINUED | OUTPATIENT
Start: 2023-07-24 | End: 2023-07-24

## 2023-07-24 RX ORDER — L.ACIDOPH/B.ANIMALIS/B.LONGUM 15B CELL
1 CAPSULE ORAL
Refills: 0 | DISCHARGE

## 2023-07-24 RX ORDER — PANTOPRAZOLE SODIUM 20 MG/1
40 TABLET, DELAYED RELEASE ORAL
Refills: 0 | Status: DISCONTINUED | OUTPATIENT
Start: 2023-07-24 | End: 2023-08-07

## 2023-07-24 RX ORDER — HYDROMORPHONE HYDROCHLORIDE 2 MG/ML
4 INJECTION INTRAMUSCULAR; INTRAVENOUS; SUBCUTANEOUS EVERY 4 HOURS
Refills: 0 | Status: DISCONTINUED | OUTPATIENT
Start: 2023-07-24 | End: 2023-07-24

## 2023-07-24 RX ORDER — CELECOXIB 200 MG/1
1 CAPSULE ORAL
Qty: 0 | Refills: 0 | DISCHARGE
Start: 2023-07-24

## 2023-07-24 RX ORDER — APREPITANT 80 MG/1
40 CAPSULE ORAL ONCE
Refills: 0 | Status: COMPLETED | OUTPATIENT
Start: 2023-07-24 | End: 2023-07-24

## 2023-07-24 RX ORDER — OXYCODONE HYDROCHLORIDE 5 MG/1
10 TABLET ORAL
Refills: 0 | Status: DISCONTINUED | OUTPATIENT
Start: 2023-07-24 | End: 2023-07-24

## 2023-07-24 RX ORDER — SODIUM CHLORIDE 9 MG/ML
1000 INJECTION INTRAMUSCULAR; INTRAVENOUS; SUBCUTANEOUS
Refills: 0 | Status: DISCONTINUED | OUTPATIENT
Start: 2023-07-24 | End: 2023-08-07

## 2023-07-24 RX ORDER — OXYCODONE HYDROCHLORIDE 5 MG/1
5 TABLET ORAL
Refills: 0 | Status: DISCONTINUED | OUTPATIENT
Start: 2023-07-24 | End: 2023-07-24

## 2023-07-24 RX ORDER — PANTOPRAZOLE SODIUM 20 MG/1
1 TABLET, DELAYED RELEASE ORAL
Qty: 0 | Refills: 0 | DISCHARGE
Start: 2023-07-24

## 2023-07-24 RX ORDER — MAGNESIUM HYDROXIDE 400 MG/1
30 TABLET, CHEWABLE ORAL DAILY
Refills: 0 | Status: DISCONTINUED | OUTPATIENT
Start: 2023-07-24 | End: 2023-08-07

## 2023-07-24 RX ORDER — KETOROLAC TROMETHAMINE 30 MG/ML
15 SYRINGE (ML) INJECTION EVERY 6 HOURS
Refills: 0 | Status: COMPLETED | OUTPATIENT
Start: 2023-07-24 | End: 2023-07-25

## 2023-07-24 RX ORDER — ACETAMINOPHEN 500 MG
975 TABLET ORAL EVERY 8 HOURS
Refills: 0 | Status: DISCONTINUED | OUTPATIENT
Start: 2023-07-24 | End: 2023-08-07

## 2023-07-24 RX ORDER — CEPHALEXIN 500 MG
1 CAPSULE ORAL
Qty: 0 | Refills: 0 | DISCHARGE
Start: 2023-07-24

## 2023-07-24 RX ORDER — ASPIRIN/CALCIUM CARB/MAGNESIUM 324 MG
1 TABLET ORAL
Qty: 0 | Refills: 0 | DISCHARGE
Start: 2023-07-24

## 2023-07-24 RX ADMIN — Medication 975 MILLIGRAM(S): at 07:08

## 2023-07-24 RX ADMIN — CELECOXIB 400 MILLIGRAM(S): 200 CAPSULE ORAL at 07:08

## 2023-07-24 RX ADMIN — APREPITANT 40 MILLIGRAM(S): 80 CAPSULE ORAL at 07:08

## 2023-07-24 RX ADMIN — Medication 2000 MILLIGRAM(S): at 13:13

## 2023-07-24 RX ADMIN — OXYCODONE HYDROCHLORIDE 5 MILLIGRAM(S): 5 TABLET ORAL at 14:13

## 2023-07-24 RX ADMIN — Medication 15 MILLIGRAM(S): at 13:54

## 2023-07-24 NOTE — DISCHARGE NOTE PROVIDER - HOSPITAL COURSE
The patient underwent a right ANTERIOR TOTAL HIP REPLACEMENT on 7/24/23. The patient received antibiotics consistent with SCIP guidelines. The patient underwent the procedure and had no intra-operative complications. Post-operatively, the patient was seen by medicine and PT. The patient received Aspirin for DVTP. The patient received pain medications per orthopedic pain management protocol and the pain was appropriately controlled. Patient was instructed on anterior total hip precautions by PT. The patient did not have any post-operative medical complications. The patient was discharged in stable condition.

## 2023-07-24 NOTE — DISCHARGE NOTE PROVIDER - YES NO FOR MLM POSITIVE OR NEGATIVE COVID RESULT
Discharge Summary


Discharge Summary: 





DISCHARGE DIAGNOSES:


* Mechanical fall with injury


* Fracture dislocation left shoulder, status post reversed left shoulder total 

arthroplasty


* Postoperative hypotension, uncertain etiology, resolved


* Post hemorrhagic anemia from injury and surgery, stable


* Minimal elevations of troponin likely due to hypotension mentioned above, no 

concern for acute coronary syndrome


* Chronic gait instability


* Down syndrome


* Chronic seizure disorder stable here without seizure





CONSULTANTS:


Dr. Sixto Rojas





PROCEDURES:


Reverse total arthroplasty of left shoulder





HOSPITAL COURSE SUMMARY:


This patient who has Down syndrome and a significant gait instability normally 

uses a walker for ambulating.  At his home he got up to do some activity and 

while walking across room had a fall.  He was not using his walker.  He fell 

onto his left side and suffered fracture dislocation at the left shoulder with 

significant displacement.  If taken to the operating room by Dr. Rojas who 

performed a reverse shoulder total arthroplasty.  Intraoperatively there were 

no complications.  However the patient did suffer some significant hypotension 

and postoperative setting but it was unclear what the etiology of this 

hypotension was.  He did not appear to have significant active bleeding after 

the surgery, though was fairly anemic postop.  He was given IV fluid hydration 

and this hypotension did resolve.  The patient tolerated the whole episode well.





At this time he has recovered uneventfully otherwise.  He is eating normally 

working well with physical therapy, no respiratory or cardiac compromise.  He 

does notably have more significant gait instability than his baseline.  At this 

point with his shoulder in immobilization and his gait instability he will need 

ongoing significant supervision and physical occupational therapy 

rehabilitation.





PENDING TEST RESULTS:


None





MEDICATION CHANGES:


None





FOLLOW-UP PLAN:


He will be transferred at this time to skilled nursing facility for ongoing 

physical therapy and rehabilitation


He will follow up with Dr. Rojas per Dr. Rojas instructions





Greater than 35 minutes bedside and care coordination time today ,

## 2023-07-24 NOTE — DISCHARGE NOTE NURSING/CASE MANAGEMENT/SOCIAL WORK - NSDCPEFALRISK_GEN_ALL_CORE
For information on Fall & Injury Prevention, visit: [email or web address] OR  [email or web address] OR  https://www.cdc.gov/steadi/patient.html

## 2023-07-24 NOTE — DISCHARGE NOTE PROVIDER - CARE PROVIDER_API CALL
Octaviano Doyle.  Orthopaedic Surgery  [address], [address]  [place] [address]  Phone: [phone]  Fax: [phone]  Follow Up Time:

## 2023-07-24 NOTE — DISCHARGE NOTE NURSING/CASE MANAGEMENT/SOCIAL WORK - NSSCNAMETXT_GEN_ALL_CORE
[facility] (formerly [facility]) Phone: [phone]

## 2023-07-24 NOTE — DISCHARGE NOTE PROVIDER - NSDCMRMEDTOKEN_GEN_ALL_CORE_FT
irbesartan:  orally   labetalol 100 mg oral tablet: 1 tab(s) orally 2 times a day  mupirocin 2% topical ointment: 1 application in each nostril 2 times a day 7/19-7/23/23  Probiotic Formula oral capsule: 1 orally once a day  Repatha 140 mg/mL subcutaneous solution:  subcutaneous every 2 weeks   aspirin 81 mg oral delayed release tablet: 1 tab(s) orally 2 times a day  celecoxib 200 mg oral capsule: 1 cap(s) orally every 12 hours  cephalexin 500 mg oral capsule: 1 cap(s) orally 4 times a day  irbesartan:  orally   labetalol 100 mg oral tablet: 1 tab(s) orally 2 times a day  oxyCODONE 5 mg oral tablet: 1 tab(s) orally every 3 hours As needed Mild Pain (1 - 3)  pantoprazole 40 mg oral delayed release tablet: 1 tab(s) orally once a day (before a meal)  Probiotic Formula oral capsule: 1 orally once a day  Repatha 140 mg/mL subcutaneous solution:  subcutaneous every 2 weeks  senna leaf extract oral tablet: 2 tab(s) orally once a day (at bedtime)

## 2023-07-24 NOTE — DISCHARGE NOTE NURSING/CASE MANAGEMENT/SOCIAL WORK - PATIENT PORTAL LINK FT
You can access the FollowMyHealth Patient Portal offered by [facility] by registering at the following website: [email or web address]. By joining [facility]’s FollowMyHealth portal, you will also be able to view your health information using other applications (apps) compatible with our system.

## 2023-07-24 NOTE — DISCHARGE NOTE NURSING/CASE MANAGEMENT/SOCIAL WORK - NSDCVIVACCINE_GEN_ALL_CORE_FT
Tdap; 01-Nov-2016 20:26; Cindy Horne (ROBBIN); [employer]; [id number]; IntraMuscular; Deltoid Right.; 0.5 milliLiter(s); VIS (VIS Published: 09-May-2013, VIS Presented: 01-Nov-2016);

## 2023-07-24 NOTE — DISCHARGE NOTE PROVIDER - NSDCFUSCHEDAPPT_GEN_ALL_CORE_FT
[facility]  ORTHOSURG [address]  Scheduled Appointment: 08/17/2023    Octaviano Doyle  [facility]  ORTHOSURG [address]  Scheduled Appointment: 09/08/2023

## 2023-07-24 NOTE — DISCHARGE NOTE PROVIDER - NSDCCPTREATMENT_GEN_ALL_CORE_FT
PRINCIPAL PROCEDURE  Procedure: Total replacement of right hip using JUSTINA  Findings and Treatment:

## 2023-07-24 NOTE — DISCHARGE NOTE PROVIDER - NSDCFUADDINST_GEN_ALL_CORE_FT
The patient will be seen in the office between 2-3 weeks for wound check.   **Your first post-operative visit has been scheduled prior to your admission. PLEASE CONTACT OFFICE TO CONFIRM THE APPOINTMENT DATE. Sutures/Staples/Tape will be removed at that time.  **  The silver based dressing is to be removed 7 days from the date of surgery.   ** CONTACT THE OFFICE IF THE FOLLOWING DEVELOP:  - the dressing becomes soiled or saturated  - you develop a fever greater that 101F  - the wound becomes red or you develop blistering around the wound  * Patient may shower after post-op day #3.   * The patient will continue home PT consistent with anterior total hip replacement protocol and will continue to practice anterior total hip precautions for a minimum of 6 week. Transition to outpatient PT will occur at the time of the first office visit.   * The patient is FULL weight bearing.  * The patient will continue ASPIRIN for 6 weeks after surgery for blood clot prevention.  * While on aspirin, the patient will take daily omeprazole or other similar medication to protect the stomach from irritation.   * The patient will take OXYCODONE AND TYLENOL for pain control and adjust according to prescription and patient needs. Contact the office if pain increases while taking prescribed pain medications or related concerns develop.   * Celebrex will be taken twice daily for 3 weeks for pain control and prevention of excessive bone growth. Additional prescription may be requested at your office follow-up visit.  * The patient will take Senna S while taking oxycodone to prevent narcotic associated constipation.  Additionally, increase water intake (drink at least 8 glasses of water daily) and try adding fiber to the diet by eating fruits, vegetables and foods that are rich in grains. If constipation is experienced, contact the medical/primary care provider to discuss further treatment options.  * To avoid injury at home:  - continue use of rolling walker until cleared by physical therapist  - have family or friend remove all throw rug or objects in hallways that may present a trip hazard.  - if you experience any dizziness or medical concerns, call your medical doctor or  911.  * The implant may activate metal detection devices.

## 2023-07-27 ENCOUNTER — TRANSCRIPTION ENCOUNTER (OUTPATIENT)
Age: 73
End: 2023-07-27

## 2023-07-31 ENCOUNTER — NON-APPOINTMENT (OUTPATIENT)
Age: 73
End: 2023-07-31

## 2023-07-31 ENCOUNTER — TRANSCRIPTION ENCOUNTER (OUTPATIENT)
Age: 73
End: 2023-07-31

## 2023-08-07 ENCOUNTER — TRANSCRIPTION ENCOUNTER (OUTPATIENT)
Age: 73
End: 2023-08-07

## 2023-08-08 ENCOUNTER — RX RENEWAL (OUTPATIENT)
Age: 73
End: 2023-08-08

## 2023-08-08 RX ORDER — CELECOXIB 200 MG/1
200 CAPSULE ORAL
Qty: 42 | Refills: 0 | Status: ACTIVE | COMMUNITY
Start: 2023-07-14 | End: 1900-01-01

## 2023-08-17 ENCOUNTER — APPOINTMENT (OUTPATIENT)
Dept: ORTHOPEDIC SURGERY | Facility: CLINIC | Age: 73
End: 2023-08-17
Payer: MEDICARE

## 2023-08-17 VITALS — HEIGHT: 65 IN | BODY MASS INDEX: 24.32 KG/M2 | WEIGHT: 146 LBS

## 2023-08-17 DIAGNOSIS — Z96.641 PRESENCE OF RIGHT ARTIFICIAL HIP JOINT: ICD-10-CM

## 2023-08-17 DIAGNOSIS — Z96.649 PRESENCE OF UNSPECIFIED ARTIFICIAL HIP JOINT: ICD-10-CM

## 2023-08-17 DIAGNOSIS — Z47.1 AFTERCARE FOLLOWING JOINT REPLACEMENT SURGERY: ICD-10-CM

## 2023-08-17 PROCEDURE — 73502 X-RAY EXAM HIP UNI 2-3 VIEWS: CPT | Mod: 26,RT

## 2023-08-17 PROCEDURE — 99024 POSTOP FOLLOW-UP VISIT: CPT

## 2023-08-17 NOTE — HISTORY OF PRESENT ILLNESS
[de-identified] : S/P Right robotic assisted anterior THR with JUSTINA, DOS: 7/24/23. [de-identified] : She  is doing well s/p right total hip replacement. She  completed in home physical therapy and is due to begin outpatient PT soon. She  is taking Tylenol for pain and pain level is controlled. She  is taking aspirin for DVT ppx. Patient denies any fevers chills or constitutional symptoms. Patient denies any falls or Trauma. Overall patient is doing well.  [de-identified] : Exam right hip: Skin reveals well-healing incision without signs of infection. Straight leg raise is smooth and intact.  Flexion to 100 degrees, external rotation to 40 degrees, internal rotation to 20 degrees all without pain.  Sensations intact.  Strength 5/5.  [de-identified] : X-ray 2 views right hip and AP pelvis demonstrate well fixed and aligned right  total hip replacement without signs of loosening or fracture.  [de-identified] : The patient is doing very well 3 wks after right anterior total hip replacement. The patient will be transitioned to outpatient physical therapy and a prescription was given for that. The patient will continue aspirin 81mg twice per day for DVT prophylaxis for next 3 wks. Anterior hip precautions were reinforced. Overall the patient is very happy with their outcome so far. Followup in 3 wks with repeat x-rays.

## 2023-08-23 ENCOUNTER — TRANSCRIPTION ENCOUNTER (OUTPATIENT)
Age: 73
End: 2023-08-23

## 2023-08-29 ENCOUNTER — TRANSCRIPTION ENCOUNTER (OUTPATIENT)
Age: 73
End: 2023-08-29

## 2023-08-29 ENCOUNTER — RX RENEWAL (OUTPATIENT)
Age: 73
End: 2023-08-29

## 2023-08-29 RX ORDER — PANTOPRAZOLE 40 MG/1
40 TABLET, DELAYED RELEASE ORAL
Qty: 42 | Refills: 0 | Status: ACTIVE | COMMUNITY
Start: 2023-07-14 | End: 1900-01-01

## 2023-08-30 ENCOUNTER — TRANSCRIPTION ENCOUNTER (OUTPATIENT)
Age: 73
End: 2023-08-30

## 2023-08-30 ENCOUNTER — NON-APPOINTMENT (OUTPATIENT)
Age: 73
End: 2023-08-30

## 2023-08-30 RX ORDER — CEPHALEXIN 500 MG/1
500 CAPSULE ORAL EVERY 6 HOURS
Qty: 28 | Refills: 0 | Status: ACTIVE | COMMUNITY
Start: 2023-08-30 | End: 1900-01-01

## 2023-08-30 RX ORDER — L.ACIDOPH/L.BULG/B.BIF/S.THERM 1B-250 MG
TABLET ORAL
Qty: 28 | Refills: 0 | Status: ACTIVE | COMMUNITY
Start: 2023-08-30 | End: 1900-01-01

## 2023-08-31 ENCOUNTER — TRANSCRIPTION ENCOUNTER (OUTPATIENT)
Age: 73
End: 2023-08-31

## 2023-09-04 ENCOUNTER — NON-APPOINTMENT (OUTPATIENT)
Age: 73
End: 2023-09-04

## 2023-09-08 ENCOUNTER — TRANSCRIPTION ENCOUNTER (OUTPATIENT)
Age: 73
End: 2023-09-08

## 2023-09-08 ENCOUNTER — APPOINTMENT (OUTPATIENT)
Dept: ORTHOPEDIC SURGERY | Facility: CLINIC | Age: 73
End: 2023-09-08
Payer: MEDICARE

## 2023-09-08 VITALS — HEIGHT: 65 IN | WEIGHT: 146 LBS | BODY MASS INDEX: 24.32 KG/M2

## 2023-09-08 PROBLEM — N32.81 OVERACTIVE BLADDER: Chronic | Status: ACTIVE | Noted: 2023-07-05

## 2023-09-08 PROBLEM — E78.5 HYPERLIPIDEMIA, UNSPECIFIED: Chronic | Status: ACTIVE | Noted: 2023-07-05

## 2023-09-08 PROCEDURE — 99024 POSTOP FOLLOW-UP VISIT: CPT

## 2023-09-08 PROCEDURE — 73502 X-RAY EXAM HIP UNI 2-3 VIEWS: CPT

## 2023-09-08 NOTE — HISTORY OF PRESENT ILLNESS
[de-identified] : S/P Right robotic assisted anterior THR with JUSTINA, DOS: 7/24/23. [de-identified] : ANDRIY JULIO is a 72 year female 6 weeks s/p right THR. She is ambulating and transferring well without assistive devices. She reports a small amount of drainage from her distal incision while in [place] and as such has been on Keflex with 1 day left. She reports continuing outpatient physical therapy with good improvement of strength. She continues aspirin for DVT prophylaxis. Overall, she is very happy with the results of the surgery although she reports a fall recently that she believes set her back in her recovery about 12 days ago.  [de-identified] : Exam of the right hip shows hip external rotation of 40 degrees, hip internal rotation of 10 degrees with pain. Patient can perform a straight leg raise with some weakness. 5/5 motor strength bilaterally distally. Sensation intact distally.  Incision demonstrates a small suture reaction distally.  The suture knot was identified and removed.  No purulence.  No surrounding erythema.  The area was cleaned and dried and a dry gauze dressing placed.		  [de-identified] :  X-ray: AP of the pelvis and 2 views of the right hip demonstrate a right total hip arthroplasty in stable position, with no evidence of fracture, loosening, or dislocation.		  [de-identified] : The patient is doing very well 6 weeks following anterior right total hip replacement. The patient's noted discharge was likely a suture reaction. She was instructed to clean the area daily with Beta dine and dress it in gauze. She will finish her course of Keflex and she will follow up of she develops fever or chills or noted signs of infection. On imaging the patient's right hip implants appear stable and well fixed without signs of loosening or fracture. Anterior hip precautions were reviewed and recommended to be followed for another 6 weeks. The patient will continue outpatient physical therapy and gradually transition to a home exercise program over the next 6 weeks. The patient may stop taking aspirin at this point or resume preoperative aspirin dosing if applicable. Dental prophylaxis was reviewed. Follow up in 6 weeks.

## 2023-09-08 NOTE — ADDENDUM
[FreeTextEntry1] : This note was authored by Yakov Garcia working as a medical scribe for Dr. Octaviano Doyle. The note was reviewed, edited, and revised by Dr. Octaviano Doyle whom is in agreement with the exam findings, imaging findings, and treatment plan. 09/08/2023

## 2023-10-09 ENCOUNTER — TRANSCRIPTION ENCOUNTER (OUTPATIENT)
Age: 73
End: 2023-10-09

## 2023-10-23 ENCOUNTER — TRANSCRIPTION ENCOUNTER (OUTPATIENT)
Age: 73
End: 2023-10-23

## 2023-10-26 ENCOUNTER — APPOINTMENT (OUTPATIENT)
Dept: ORTHOPEDIC SURGERY | Facility: CLINIC | Age: 73
End: 2023-10-26
Payer: MEDICARE

## 2023-10-26 VITALS — HEIGHT: 65 IN | BODY MASS INDEX: 24.32 KG/M2 | WEIGHT: 146 LBS

## 2023-10-26 PROCEDURE — 73502 X-RAY EXAM HIP UNI 2-3 VIEWS: CPT

## 2023-10-26 PROCEDURE — 99212 OFFICE O/P EST SF 10 MIN: CPT

## 2023-12-10 ENCOUNTER — NON-APPOINTMENT (OUTPATIENT)
Age: 73
End: 2023-12-10

## 2023-12-13 ENCOUNTER — APPOINTMENT (OUTPATIENT)
Dept: PHYSICAL MEDICINE AND REHAB | Facility: CLINIC | Age: 73
End: 2023-12-13
Payer: MEDICARE

## 2023-12-13 VITALS
BODY MASS INDEX: 24.66 KG/M2 | WEIGHT: 148 LBS | HEART RATE: 64 BPM | SYSTOLIC BLOOD PRESSURE: 126 MMHG | HEIGHT: 65 IN | RESPIRATION RATE: 14 BRPM | DIASTOLIC BLOOD PRESSURE: 77 MMHG

## 2023-12-13 PROCEDURE — 99213 OFFICE O/P EST LOW 20 MIN: CPT

## 2023-12-13 NOTE — PHYSICAL EXAM
[FreeTextEntry1] : PE:  Constitutional: In NAD, calm and cooperative  MSK (Back/R hip)  Inspection: no gross swelling identified  Palpation: Tenderness of the right lower lumbar paraspinals  ROM: Pain at end lumbar extension, no pain with flexion  Strength: 5/5 strength in bilateral lower extremities  Reflexes: 2+ Patella reflex bilaterally, 2+ Achilles reflex bilaterally, negative clonus bilaterally  Sensation: Intact to light touch in bilateral lower extremities  Special tests:  SLR:negative bilaterally JAYESH:positive on right, negative on left FADIR: positive on right, negative on left Facet loading: positive on right, negative on left.

## 2023-12-13 NOTE — HISTORY OF PRESENT ILLNESS
[FreeTextEntry1] : Ms. ANDRIY JULIO is a 73 year old  female who presents for follow up. At last visit in 7/2023, she was ordered an MRI L Spine, was pending a R JESIKA and discussed injection therapy. She said the JESIKA helped her lateral hip and groin pain but her back pain has persisted. She has had a recent exacerbation of low back pain after using luggage.   Location:R Low back/center low back Onset:Chronic, gradually worsening with time Provocation/Palliative:Worse with walking and rising from seated position. Quality:Sharp Radiation: None Severity:Mild to moderate Timing:Not improving with time  No bowel/bladder changes. No groin numbness.

## 2023-12-13 NOTE — ASSESSMENT
[FreeTextEntry1] : Ms. ANDRIY JULIO is a 72 year old female who presents with R lower back pain, likely due to spondylosis, possible SI joint related pain.  Denies any red flag signs. Will recommend: - Will obtain MRI L Spine to evaluate for spondylosis given persistent pain - Given HEP   RTC after MRI. Patient in agreement with plan. Patient aware of red flag signs including any changes to their bowel/bladder control, groin numbness or new weakness. Patient knows to seek immediate attention by calling 911 or going to nearest ER if these symptoms appear.

## 2023-12-15 ENCOUNTER — APPOINTMENT (OUTPATIENT)
Dept: MRI IMAGING | Facility: CLINIC | Age: 73
End: 2023-12-15
Payer: MEDICARE

## 2023-12-15 ENCOUNTER — OUTPATIENT (OUTPATIENT)
Dept: OUTPATIENT SERVICES | Facility: HOSPITAL | Age: 73
LOS: 1 days | End: 2023-12-15
Payer: MEDICARE

## 2023-12-15 DIAGNOSIS — M47.816 SPONDYLOSIS WITHOUT MYELOPATHY OR RADICULOPATHY, LUMBAR REGION: ICD-10-CM

## 2023-12-15 DIAGNOSIS — Z98.890 OTHER SPECIFIED POSTPROCEDURAL STATES: Chronic | ICD-10-CM

## 2023-12-15 PROCEDURE — 72148 MRI LUMBAR SPINE W/O DYE: CPT

## 2023-12-15 PROCEDURE — 72148 MRI LUMBAR SPINE W/O DYE: CPT | Mod: 26,MH

## 2023-12-20 ENCOUNTER — APPOINTMENT (OUTPATIENT)
Dept: PHYSICAL MEDICINE AND REHAB | Facility: CLINIC | Age: 73
End: 2023-12-20
Payer: MEDICARE

## 2023-12-20 VITALS
DIASTOLIC BLOOD PRESSURE: 85 MMHG | HEIGHT: 65 IN | HEART RATE: 71 BPM | SYSTOLIC BLOOD PRESSURE: 130 MMHG | BODY MASS INDEX: 24.66 KG/M2 | WEIGHT: 148 LBS

## 2023-12-20 DIAGNOSIS — M47.816 SPONDYLOSIS W/OUT MYELOPATHY OR RADICULOPATHY, LUMBAR REGION: ICD-10-CM

## 2023-12-20 DIAGNOSIS — M53.3 SACROCOCCYGEAL DISORDERS, NOT ELSEWHERE CLASSIFIED: ICD-10-CM

## 2023-12-20 PROCEDURE — 99213 OFFICE O/P EST LOW 20 MIN: CPT

## 2023-12-20 NOTE — HISTORY OF PRESENT ILLNESS
[FreeTextEntry1] : Ms. ANDRIY JULIO is a 73 year old  female who presents for follow up. At last visit, she was ordered an MRI L Spine and given a HEP. Overall she feels a little better. Denies any new symptoms.   Location:R Low back/center low back Onset:Chronic, gradually worsening with time Provocation/Palliative:Worse with walking and rising from seated position. Quality:Sharp Radiation: None Severity:Mild to moderate Timing:Not improving with time  No bowel/bladder changes. No groin numbness. 
No

## 2023-12-20 NOTE — DATA REVIEWED
[FreeTextEntry1] : EXAM: [id number] - MR SPINE LUMBAR  - ORDERED BY: STEVIE SU   PROCEDURE DATE:  12/15/2023    INTERPRETATION:  LUMBAR SPINE MRI  CLINICAL INFORMATION: Chronic pain following strain injury. No prior spinal surgery.  TECHNIQUE: Multiplanar, multisequence MRI was obtained of the lumbar spine.  COMPARISON: None available.  FINDINGS:  OSSEOUS: Grade 1 retrolisthesis at L2-L3 and L3-L4. No fracture or aggressive osseous neoplasm.  LEVEL BY LEVEL ANALYSIS:  T12-L1: No significant abnormality.  L1-L2: No significant abnormality.  L2-L3: Annular disc bulging contribute to mild spinal canal stenosis. Bilateral neural canals are adequate..  L3-L4: Annular disc bulging and mild to moderate facet arthrosis contribute to mild spinal canal stenosis and mild left-sided neural canal stenosis. Right neural canal is adequate.  L4-L5: Right posterior paracentral predominant disc osteophyte complex and moderate right worse than left facet arthrosis contribute to mild spinal canal stenosis with partial effacement of the right lateral recess and mild right-sided neural canal stenosis. Left neural canal is adequate.  L5-S1: Left posterior paracentral predominant disc osteophyte complex versus small protrusion contributes to partial effacement of the left lateral recess and subtle abutment of the descending ipsilateral S1 nerve root. Mild left-sided neural canal stenosis. Spinal canal and right neural canal are adequate.  GENERAL: Conus medullaris tip is anatomic in positioning. No intradural or extradural lesion.  IMPRESSION: 1.  No high-grade lumbar spinal canal or neural canal stenosis. 2.  At L5-S1 a left posterior paracentral predominant disc osteophyte complex versus small protrusion contributes to lateral recess stenosis. Correlated for possible left-sided S1 radiculopathy.  ADDITIONAL CLINICAL INFORMATION: Low back muscle strain S39.012A  --- End of Report ---       JOSEF CHIN MD; Attending Radiologist This document has been electronically signed. Dec 18 2023  9:18AM

## 2023-12-20 NOTE — ASSESSMENT
[FreeTextEntry1] : Ms. ANDRIY JULIO is a 73 year old female who presents with R lower back pain, likely due to spondylosis, possible SI joint related pain. Denies any red flag signs. Will recommend: - Reviewed MRI L Spine with patient - Discussed with patient the risks (including but not limited to bleeding, infection, nerve damage, etc), benefits and alternatives to  sacroiliac joint steroid injection for which patient understands but would like to hold off for now - Continue HEP - Patient to take Tylenol PRN,  up to 3g/day  RTC as needed. Patient in agreement with plan. Patient aware of red flag signs including any changes to their bowel/bladder control, groin numbness or new weakness. Patient knows to seek immediate attention by calling 911 or going to nearest ER if these symptoms appear.

## 2024-05-19 ENCOUNTER — NON-APPOINTMENT (OUTPATIENT)
Age: 74
End: 2024-05-19

## 2024-05-19 ASSESSMENT — HOOS JR
SITTING: MILD
HOOS JR RAW SCORE: 1

## 2024-07-17 ENCOUNTER — APPOINTMENT (OUTPATIENT)
Dept: DERMATOLOGY | Facility: CLINIC | Age: 74
End: 2024-07-17
Payer: MEDICARE

## 2024-07-17 PROCEDURE — 99214 OFFICE O/P EST MOD 30 MIN: CPT | Mod: 25

## 2024-07-17 PROCEDURE — 17003 DESTRUCT PREMALG LES 2-14: CPT

## 2024-07-17 PROCEDURE — 17000 DESTRUCT PREMALG LESION: CPT

## 2024-07-26 ENCOUNTER — APPOINTMENT (OUTPATIENT)
Dept: ORTHOPEDIC SURGERY | Facility: CLINIC | Age: 74
End: 2024-07-26
Payer: MEDICARE

## 2024-07-26 VITALS
HEIGHT: 65 IN | WEIGHT: 148 LBS | DIASTOLIC BLOOD PRESSURE: 87 MMHG | BODY MASS INDEX: 24.66 KG/M2 | SYSTOLIC BLOOD PRESSURE: 139 MMHG

## 2024-07-26 DIAGNOSIS — Z96.641 PRESENCE OF RIGHT ARTIFICIAL HIP JOINT: ICD-10-CM

## 2024-07-26 PROCEDURE — G2211 COMPLEX E/M VISIT ADD ON: CPT

## 2024-07-26 PROCEDURE — 99213 OFFICE O/P EST LOW 20 MIN: CPT

## 2024-07-26 PROCEDURE — 73502 X-RAY EXAM HIP UNI 2-3 VIEWS: CPT

## 2024-07-26 NOTE — HISTORY OF PRESENT ILLNESS
[de-identified] :  ANDRIY JULIO is a 73 year female presenting 1 year s/p right total hip replacement. She reports a minor lateral hip sensitivity when getting into a car on the drivers side. The patient reports continuing a home exercise routine. The patient has returned to daily activities of life without significant pain or discomfort. Overall, the patient is very happy with the result of the surgery.

## 2024-07-26 NOTE — PHYSICAL EXAM
[de-identified] :  The patient appears well nourished and in no apparent distress. The patient is alert and oriented to person, place, and time. Affect and mood appear normal. The head is normocephalic and atraumatic. The eyes reveal normal sclera and extra ocular muscles are intact. The tongue is midline with no apparent lesions. Skin shows normal turgor with no evidence of eczema or psoriasis. No respiratory distress noted. Sensation grossly intact. [de-identified] :  Exam of the right hip shows a well healed incision, hip flexion of 120 degrees, hip external rotation of 50 degrees, hip internal rotation of 25 degrees.  The patient can perform a straight leg raise with good speed and good strength. 5/5 motor strength bilaterally distally. Sensation intact distally. [de-identified] : X-ray: AP of the pelvis and 2 views of the right hip demonstrate a right total hip arthroplasty in stable position, with no evidence of fracture, loosening, or dislocation.

## 2024-07-26 NOTE — REASON FOR VISIT
[Follow-Up Visit] : a follow-up visit for [Other: ____] : [unfilled] [FreeTextEntry2] : S/P Right robotic assisted anterior THR with JUSTINA, DOS: 7/24/23.

## 2024-07-26 NOTE — DISCUSSION/SUMMARY
[de-identified] :  The patient is doing very well 1 year following total right hip replacement. The patient will continue their home exercises. She was suggested to do stretching to help with tightness sensation over the lateral hip. Overall the patient is very happy with the outcome of the surgery. Dental prophylaxis was reviewed. Follow up in 5 years for radiographic surveillance.

## 2024-07-27 ENCOUNTER — NON-APPOINTMENT (OUTPATIENT)
Age: 74
End: 2024-07-27

## 2025-06-03 ENCOUNTER — NON-APPOINTMENT (OUTPATIENT)
Age: 75
End: 2025-06-03

## 2025-06-11 NOTE — HISTORY OF PRESENT ILLNESS
[6] : 6 [Throbbing] : throbbing [Intermittent] : intermittent [Meds] : meds [] : no [FreeTextEntry1] : left shoulder  [FreeTextEntry5] : pt has been having left shoulder pain since the beginning of October, pt is very active and plays a lot of sports. pt has weakness in the shoulder. pt tried MDP which did not help with her pain  [FreeTextEntry7] : down her arm and sometimes up to her neck  [FreeTextEntry9] : tylenol  [de-identified] : movement  [de-identified] : 11/2022 [de-identified] :  [de-identified] : MRI  Warm

## 2025-07-16 ENCOUNTER — NON-APPOINTMENT (OUTPATIENT)
Age: 75
End: 2025-07-16

## 2025-07-18 ENCOUNTER — APPOINTMENT (OUTPATIENT)
Dept: DERMATOLOGY | Facility: CLINIC | Age: 75
End: 2025-07-18
Payer: MEDICARE

## 2025-07-18 PROCEDURE — 99214 OFFICE O/P EST MOD 30 MIN: CPT

## 2025-08-26 ENCOUNTER — APPOINTMENT (OUTPATIENT)
Dept: ORTHOPEDIC SURGERY | Facility: CLINIC | Age: 75
End: 2025-08-26
Payer: MEDICARE

## 2025-08-26 ENCOUNTER — NON-APPOINTMENT (OUTPATIENT)
Age: 75
End: 2025-08-26

## 2025-08-26 VITALS
BODY MASS INDEX: 24.83 KG/M2 | SYSTOLIC BLOOD PRESSURE: 116 MMHG | WEIGHT: 149 LBS | HEIGHT: 65 IN | OXYGEN SATURATION: 100 % | HEART RATE: 68 BPM | DIASTOLIC BLOOD PRESSURE: 78 MMHG

## 2025-08-26 DIAGNOSIS — Z47.1 AFTERCARE FOLLOWING JOINT REPLACEMENT SURGERY: ICD-10-CM

## 2025-08-26 DIAGNOSIS — Z96.641 AFTERCARE FOLLOWING JOINT REPLACEMENT SURGERY: ICD-10-CM

## 2025-08-26 DIAGNOSIS — Z96.641 PRESENCE OF RIGHT ARTIFICIAL HIP JOINT: ICD-10-CM

## 2025-08-26 PROCEDURE — 99214 OFFICE O/P EST MOD 30 MIN: CPT

## 2025-08-26 PROCEDURE — 73502 X-RAY EXAM HIP UNI 2-3 VIEWS: CPT | Mod: RT

## 2025-08-26 PROCEDURE — G2211 COMPLEX E/M VISIT ADD ON: CPT

## (undated) DEVICE — DRSG OPSITE 2.5 X 2"

## (undated) DEVICE — DRAPE C ARM UNIVERSAL

## (undated) DEVICE — VENODYNE/SCD SLEEVE CALF MEDIUM

## (undated) DEVICE — ELCTR AQUAMANTYS BIPOLAR SEALER 6.0

## (undated) DEVICE — GLV COTTON LG STERILE

## (undated) DEVICE — DRAPE BACK TABLE COVER HEAVY DUTY 2 TIER 72"

## (undated) DEVICE — SYR LUER LOK 30CC

## (undated) DEVICE — ELCTR STRYKER NEPTUNE SMOKE EVACUATION PENCIL (GREEN)

## (undated) DEVICE — DRAPE XL SHEET 77X98"

## (undated) DEVICE — BLADE SURGICAL #15 CARBON

## (undated) DEVICE — ELCTR GROUNDING PAD ADULT COVIDIEN

## (undated) DEVICE — DRSG KERLIX MED 6"

## (undated) DEVICE — SAW BLADE ZIMMER FOR STRYKER OSCILLATING FLAT 90X19X1.27MM

## (undated) DEVICE — SOL IRR BAG NS 0.9% 3000ML

## (undated) DEVICE — GLV 8.5 PROTEXIS (WHITE)

## (undated) DEVICE — SUT ETHIBOND 2 30" V37

## (undated) DEVICE — SUT VICRYL 2-0 27" CT-2 UNDYED

## (undated) DEVICE — DRSG COMBINE 5X9"

## (undated) DEVICE — DRAPE TOWEL BLUE STICKY

## (undated) DEVICE — SUT VICRYL 3-0 18" SH UNDYED (POP-OFF)

## (undated) DEVICE — SOL IRR POUR H2O 1000ML

## (undated) DEVICE — VISITEC 4X4

## (undated) DEVICE — POSITIONER FOAM EGG CRATE ULNAR 2PCS (PINK)

## (undated) DEVICE — HOOD FLYTE STRYKER SURGICOOL W PEELAWAY

## (undated) DEVICE — SUT MONOCRYL 3-0 27" PS-2 UNDYED

## (undated) DEVICE — DRSG DERMABOND 0.7ML

## (undated) DEVICE — ELCTR BOVIE TIP BLADE INSULATED 4" EDGE

## (undated) DEVICE — WOUND IRR SURGIPHOR

## (undated) DEVICE — DRAPE U LONG 47X70"

## (undated) DEVICE — DRAPE IOBAN 33" X 23"

## (undated) DEVICE — SUT SILK 2-0 18" TIES

## (undated) DEVICE — HOOD T7 NON-PEELAWAY

## (undated) DEVICE — SYR ASEPTO

## (undated) DEVICE — PACK TOTAL HIP

## (undated) DEVICE — DRAPE 3/4 SHEET 52X76"

## (undated) DEVICE — MAKO DRAPE KIT

## (undated) DEVICE — NDL HYPO SAFE 20G X 1.5" (YELLOW)

## (undated) DEVICE — SLING SHOULDER IMMOBILIZER CLINIC LARGE

## (undated) DEVICE — SPONGE PEANUT AUTO COUNT

## (undated) DEVICE — DRAPE ANTERIOR TABLE ESYSUIT HANA

## (undated) DEVICE — WARMING BLANKET UPPER ADULT

## (undated) DEVICE — DRSG MEPILEX 10 X 25CM (4 X 10") POST OP AG SILVER

## (undated) DEVICE — MAKO VIZADISC HIP PROCEDURE TRACKING KIT

## (undated) DEVICE — DRAPE 1/2 SHEET 40X57"

## (undated) DEVICE — SUT STRATAFIX SPIRAL PDO 1 36CM CTX